# Patient Record
Sex: FEMALE | Race: WHITE | NOT HISPANIC OR LATINO | Employment: UNEMPLOYED | URBAN - METROPOLITAN AREA
[De-identification: names, ages, dates, MRNs, and addresses within clinical notes are randomized per-mention and may not be internally consistent; named-entity substitution may affect disease eponyms.]

---

## 2017-02-02 ENCOUNTER — COMMUNICATION - HEALTHEAST (OUTPATIENT)
Dept: FAMILY MEDICINE | Facility: CLINIC | Age: 52
End: 2017-02-02

## 2017-02-02 DIAGNOSIS — N95.9 MENOPAUSAL AND POSTMENOPAUSAL DISORDER: ICD-10-CM

## 2017-03-15 ENCOUNTER — RECORDS - HEALTHEAST (OUTPATIENT)
Dept: MAMMOGRAPHY | Facility: CLINIC | Age: 52
End: 2017-03-15

## 2017-03-15 DIAGNOSIS — Z12.31 ENCOUNTER FOR SCREENING MAMMOGRAM FOR MALIGNANT NEOPLASM OF BREAST: ICD-10-CM

## 2017-05-01 ENCOUNTER — COMMUNICATION - HEALTHEAST (OUTPATIENT)
Dept: FAMILY MEDICINE | Facility: CLINIC | Age: 52
End: 2017-05-01

## 2017-05-01 DIAGNOSIS — N95.9 MENOPAUSAL AND POSTMENOPAUSAL DISORDER: ICD-10-CM

## 2017-05-04 ENCOUNTER — COMMUNICATION - HEALTHEAST (OUTPATIENT)
Dept: FAMILY MEDICINE | Facility: CLINIC | Age: 52
End: 2017-05-04

## 2017-05-04 DIAGNOSIS — E03.9 UNSPECIFIED HYPOTHYROIDISM: ICD-10-CM

## 2017-05-19 ENCOUNTER — OFFICE VISIT - HEALTHEAST (OUTPATIENT)
Dept: FAMILY MEDICINE | Facility: CLINIC | Age: 52
End: 2017-05-19

## 2017-05-19 DIAGNOSIS — Z13.21 SCREENING FOR ENDOCRINE, NUTRITIONAL, METABOLIC AND IMMUNITY DISORDER: ICD-10-CM

## 2017-05-19 DIAGNOSIS — E03.9 HYPOTHYROIDISM: ICD-10-CM

## 2017-05-19 DIAGNOSIS — Z00.00 VISIT FOR PREVENTIVE HEALTH EXAMINATION: ICD-10-CM

## 2017-05-19 DIAGNOSIS — R63.6 UNDERWEIGHT: ICD-10-CM

## 2017-05-19 DIAGNOSIS — M85.80 OSTEOPENIA: ICD-10-CM

## 2017-05-19 DIAGNOSIS — Z13.29 SCREENING FOR ENDOCRINE, NUTRITIONAL, METABOLIC AND IMMUNITY DISORDER: ICD-10-CM

## 2017-05-19 DIAGNOSIS — Z13.0 SCREENING FOR ENDOCRINE, NUTRITIONAL, METABOLIC AND IMMUNITY DISORDER: ICD-10-CM

## 2017-05-19 DIAGNOSIS — Z13.228 SCREENING FOR ENDOCRINE, NUTRITIONAL, METABOLIC AND IMMUNITY DISORDER: ICD-10-CM

## 2017-05-19 LAB
CHOLEST SERPL-MCNC: 201 MG/DL
FASTING STATUS PATIENT QL REPORTED: YES
HDLC SERPL-MCNC: 84 MG/DL
LDLC SERPL CALC-MCNC: 101 MG/DL
TRIGL SERPL-MCNC: 79 MG/DL

## 2017-05-19 ASSESSMENT — MIFFLIN-ST. JEOR: SCORE: 1081.25

## 2017-05-20 ENCOUNTER — COMMUNICATION - HEALTHEAST (OUTPATIENT)
Dept: FAMILY MEDICINE | Facility: CLINIC | Age: 52
End: 2017-05-20

## 2017-06-05 ENCOUNTER — RECORDS - HEALTHEAST (OUTPATIENT)
Dept: BONE DENSITY | Facility: CLINIC | Age: 52
End: 2017-06-05

## 2017-06-05 ENCOUNTER — RECORDS - HEALTHEAST (OUTPATIENT)
Dept: ADMINISTRATIVE | Facility: OTHER | Age: 52
End: 2017-06-05

## 2017-06-05 DIAGNOSIS — E03.9 HYPOTHYROIDISM, UNSPECIFIED: ICD-10-CM

## 2017-06-05 DIAGNOSIS — M85.80 OTHER SPECIFIED DISORDERS OF BONE DENSITY AND STRUCTURE, UNSPECIFIED SITE: ICD-10-CM

## 2017-06-05 DIAGNOSIS — R63.6 UNDERWEIGHT: ICD-10-CM

## 2017-06-07 ENCOUNTER — COMMUNICATION - HEALTHEAST (OUTPATIENT)
Dept: FAMILY MEDICINE | Facility: CLINIC | Age: 52
End: 2017-06-07

## 2017-06-13 ENCOUNTER — COMMUNICATION - HEALTHEAST (OUTPATIENT)
Dept: FAMILY MEDICINE | Facility: CLINIC | Age: 52
End: 2017-06-13

## 2017-06-25 ENCOUNTER — COMMUNICATION - HEALTHEAST (OUTPATIENT)
Dept: FAMILY MEDICINE | Facility: CLINIC | Age: 52
End: 2017-06-25

## 2017-06-25 DIAGNOSIS — E03.9 UNSPECIFIED HYPOTHYROIDISM: ICD-10-CM

## 2017-11-20 ENCOUNTER — COMMUNICATION - HEALTHEAST (OUTPATIENT)
Dept: FAMILY MEDICINE | Facility: CLINIC | Age: 52
End: 2017-11-20

## 2017-11-20 DIAGNOSIS — N95.9 MENOPAUSAL AND POSTMENOPAUSAL DISORDER: ICD-10-CM

## 2018-01-30 ENCOUNTER — OFFICE VISIT - HEALTHEAST (OUTPATIENT)
Dept: FAMILY MEDICINE | Facility: CLINIC | Age: 53
End: 2018-01-30

## 2018-01-30 DIAGNOSIS — F43.9 SITUATIONAL STRESS: ICD-10-CM

## 2018-01-30 DIAGNOSIS — F41.9 ANXIETY: ICD-10-CM

## 2018-03-07 ENCOUNTER — COMMUNICATION - HEALTHEAST (OUTPATIENT)
Dept: FAMILY MEDICINE | Facility: CLINIC | Age: 53
End: 2018-03-07

## 2018-04-03 ENCOUNTER — RECORDS - HEALTHEAST (OUTPATIENT)
Dept: MAMMOGRAPHY | Facility: CLINIC | Age: 53
End: 2018-04-03

## 2018-04-03 DIAGNOSIS — Z12.31 ENCOUNTER FOR SCREENING MAMMOGRAM FOR MALIGNANT NEOPLASM OF BREAST: ICD-10-CM

## 2018-04-05 ENCOUNTER — COMMUNICATION - HEALTHEAST (OUTPATIENT)
Dept: FAMILY MEDICINE | Facility: CLINIC | Age: 53
End: 2018-04-05

## 2018-04-17 ENCOUNTER — OFFICE VISIT - HEALTHEAST (OUTPATIENT)
Dept: FAMILY MEDICINE | Facility: CLINIC | Age: 53
End: 2018-04-17

## 2018-04-17 ENCOUNTER — RECORDS - HEALTHEAST (OUTPATIENT)
Dept: GENERAL RADIOLOGY | Facility: CLINIC | Age: 53
End: 2018-04-17

## 2018-04-17 DIAGNOSIS — M75.92 SHOULDER LESION, UNSPECIFIED, LEFT SHOULDER: ICD-10-CM

## 2018-04-17 DIAGNOSIS — E03.9 HYPOTHYROIDISM: ICD-10-CM

## 2018-04-17 DIAGNOSIS — Z13.29 SCREENING FOR ENDOCRINE, NUTRITIONAL, METABOLIC AND IMMUNITY DISORDER: ICD-10-CM

## 2018-04-17 DIAGNOSIS — Z13.228 SCREENING FOR ENDOCRINE, NUTRITIONAL, METABOLIC AND IMMUNITY DISORDER: ICD-10-CM

## 2018-04-17 DIAGNOSIS — Z00.00 VISIT FOR PREVENTIVE HEALTH EXAMINATION: ICD-10-CM

## 2018-04-17 DIAGNOSIS — Z13.0 SCREENING FOR ENDOCRINE, NUTRITIONAL, METABOLIC AND IMMUNITY DISORDER: ICD-10-CM

## 2018-04-17 DIAGNOSIS — M75.92 SHOULDER LESION, LEFT: ICD-10-CM

## 2018-04-17 DIAGNOSIS — Z12.11 SCREEN FOR COLON CANCER: ICD-10-CM

## 2018-04-17 DIAGNOSIS — N95.9 MENOPAUSAL DISORDER: ICD-10-CM

## 2018-04-17 DIAGNOSIS — Z13.21 SCREENING FOR ENDOCRINE, NUTRITIONAL, METABOLIC AND IMMUNITY DISORDER: ICD-10-CM

## 2018-04-17 DIAGNOSIS — F41.9 ANXIETY: ICD-10-CM

## 2018-04-17 LAB
ALBUMIN SERPL-MCNC: 4.3 G/DL (ref 3.5–5)
ALP SERPL-CCNC: 74 U/L (ref 45–120)
ALT SERPL W P-5'-P-CCNC: 20 U/L (ref 0–45)
ANION GAP SERPL CALCULATED.3IONS-SCNC: 8 MMOL/L (ref 5–18)
AST SERPL W P-5'-P-CCNC: 42 U/L (ref 0–40)
BILIRUB SERPL-MCNC: 0.6 MG/DL (ref 0–1)
BUN SERPL-MCNC: 18 MG/DL (ref 8–22)
CALCIUM SERPL-MCNC: 9.8 MG/DL (ref 8.5–10.5)
CHLORIDE BLD-SCNC: 102 MMOL/L (ref 98–107)
CHOLEST SERPL-MCNC: 209 MG/DL
CO2 SERPL-SCNC: 30 MMOL/L (ref 22–31)
CREAT SERPL-MCNC: 1.07 MG/DL (ref 0.6–1.1)
ESTRADIOL SERPL-MCNC: 60 PG/ML
FASTING STATUS PATIENT QL REPORTED: ABNORMAL
GFR SERPL CREATININE-BSD FRML MDRD: 54 ML/MIN/1.73M2
GLUCOSE BLD-MCNC: 94 MG/DL (ref 70–125)
HDLC SERPL-MCNC: 82 MG/DL
LDLC SERPL CALC-MCNC: 109 MG/DL
POTASSIUM BLD-SCNC: 4.5 MMOL/L (ref 3.5–5)
PROT SERPL-MCNC: 7.2 G/DL (ref 6–8)
SODIUM SERPL-SCNC: 140 MMOL/L (ref 136–145)
T3 SERPL-MCNC: 87 NG/DL (ref 45–175)
T4 FREE SERPL-MCNC: 1 NG/DL (ref 0.7–1.8)
TRIGL SERPL-MCNC: 88 MG/DL
TSH SERPL DL<=0.005 MIU/L-ACNC: 4.65 UIU/ML (ref 0.3–5)

## 2018-04-17 ASSESSMENT — MIFFLIN-ST. JEOR: SCORE: 1085.78

## 2018-05-01 ENCOUNTER — COMMUNICATION - HEALTHEAST (OUTPATIENT)
Dept: FAMILY MEDICINE | Facility: CLINIC | Age: 53
End: 2018-05-01

## 2018-05-14 ENCOUNTER — RECORDS - HEALTHEAST (OUTPATIENT)
Dept: ADMINISTRATIVE | Facility: OTHER | Age: 53
End: 2018-05-14

## 2018-06-21 ENCOUNTER — RECORDS - HEALTHEAST (OUTPATIENT)
Dept: ADMINISTRATIVE | Facility: OTHER | Age: 53
End: 2018-06-21

## 2018-10-23 ENCOUNTER — COMMUNICATION - HEALTHEAST (OUTPATIENT)
Dept: FAMILY MEDICINE | Facility: CLINIC | Age: 53
End: 2018-10-23

## 2018-10-23 DIAGNOSIS — E03.9 HYPOTHYROIDISM: ICD-10-CM

## 2018-12-20 ENCOUNTER — COMMUNICATION - HEALTHEAST (OUTPATIENT)
Dept: FAMILY MEDICINE | Facility: CLINIC | Age: 53
End: 2018-12-20

## 2018-12-20 ENCOUNTER — RECORDS - HEALTHEAST (OUTPATIENT)
Dept: ADMINISTRATIVE | Facility: OTHER | Age: 53
End: 2018-12-20

## 2018-12-20 DIAGNOSIS — D22.9 ATYPICAL MOLE: ICD-10-CM

## 2019-02-14 ENCOUNTER — RECORDS - HEALTHEAST (OUTPATIENT)
Dept: ADMINISTRATIVE | Facility: OTHER | Age: 54
End: 2019-02-14

## 2019-03-04 ENCOUNTER — COMMUNICATION - HEALTHEAST (OUTPATIENT)
Dept: FAMILY MEDICINE | Facility: CLINIC | Age: 54
End: 2019-03-04

## 2019-03-04 DIAGNOSIS — N95.9 MENOPAUSAL AND POSTMENOPAUSAL DISORDER: ICD-10-CM

## 2019-09-05 ENCOUNTER — COMMUNICATION - HEALTHEAST (OUTPATIENT)
Dept: FAMILY MEDICINE | Facility: CLINIC | Age: 54
End: 2019-09-05

## 2019-09-05 DIAGNOSIS — N95.9 MENOPAUSAL AND POSTMENOPAUSAL DISORDER: ICD-10-CM

## 2019-10-26 ENCOUNTER — OFFICE VISIT (OUTPATIENT)
Dept: URGENT CARE | Facility: URGENT CARE | Age: 54
End: 2019-10-26
Payer: COMMERCIAL

## 2019-10-26 VITALS
TEMPERATURE: 97.5 F | WEIGHT: 115 LBS | SYSTOLIC BLOOD PRESSURE: 100 MMHG | DIASTOLIC BLOOD PRESSURE: 64 MMHG | OXYGEN SATURATION: 100 % | HEART RATE: 69 BPM | BODY MASS INDEX: 18.48 KG/M2 | HEIGHT: 66 IN

## 2019-10-26 DIAGNOSIS — S61.011A LACERATION OF RIGHT THUMB WITHOUT FOREIGN BODY WITHOUT DAMAGE TO NAIL, INITIAL ENCOUNTER: Primary | ICD-10-CM

## 2019-10-26 PROCEDURE — 12001 RPR S/N/AX/GEN/TRNK 2.5CM/<: CPT | Performed by: FAMILY MEDICINE

## 2019-10-26 ASSESSMENT — MIFFLIN-ST. JEOR: SCORE: 1148.39

## 2019-10-27 NOTE — PROGRESS NOTES
"SUBJECTIVE:  Suzi is a 54 year old female who presents to urgent care with a laceration on the right thumb.  Immediately prior to coming here today, she was holding a piece of glass when it shattered and cut her right thumb. Quite significant bleeding ensued,  put pressure on it and they came right here. She is having only mild pain - feeling a lot of pressure as he pushes on it (). Can feel the thumb tip she thinks.     Tetanus 4/2018  She is healthy except hypothyroidism. Takes no blood thinners. Does not smoke.     OBJECTIVE:  /64 (BP Location: Left arm, Patient Position: Supine, Cuff Size: Adult Regular)   Pulse 69   Temp 97.5  F (36.4  C) (Oral)   Ht 1.676 m (5' 6\")   Wt 52.2 kg (115 lb)   SpO2 100%   BMI 18.56 kg/m     GENERAL APPEARANCE: healthy, alert and no distress  Hand: Right thumb with a laceration to the medial aspect of the thumb from just milimeters from the nail (but not including it) near the tip down to just short of the MCP joint. It is 2.5cm in length, gaping, but can be approximated, bleeding profusely, but stops with manual wound edge approximation and pressure. Once a digital nerve block was completed, the wound was explored for foreign material and none was found.   Additionally, there is a superficial scrape with skin flap on the dorsal aspect of the thumb at the base. That is only through the dermis, no significant bleeding. There is additionally a 1cm superficial scrape on the dorsal aspect of the hand just medial to the thumb that is shallow and not requiring repair.     DIAGNOSTICS  None         ASSESSMENT/PLAN:  Suzi was seen today for urgent care and laceration.    Diagnoses and all orders for this visit:    Laceration of right thumb without foreign body without damage to nail, initial encounter  -     CLOSURE SUPERFICIAL, WOUND DEHIS SIMPLE        ASSESSMENT:   Laceration as described.    PLAN:     Anesthesia with 2% Lidocaine without Epinephrine was used to " complete a digital nerve block of the right thumb using sterile technique. Wound cleansed, debrided of visible foreign material and necrotic tissue, and sutured. Seven 5-0 Ethilon sutures were used. Antibiotic ointment and dressing applied.  Wound care instructions provided. Tolerated the procedure well. Observe for any signs of infection or other problems.  Return for suture removal in 10 days (due to the high mobility of the area - discussed scarring with the sutures in longer).     The plan of care was discussed with the Patient and her family. They understand and agree with the course of treatment prescribed. A printed summary was given including instructions and medications.      Lianne Mejia MD  Family Medicine

## 2019-10-27 NOTE — PATIENT INSTRUCTIONS
Patient Education     Suture Care    Stitches (sutures) are used to close wounds. Sutures also help stop bleeding and speed healing. To help your wound heal, follow the tips on this handout.  Some sutures need to be removed by a healthcare provider. Others dissolve on their own. Sometimes strips of tape are used. You ll be told what kind of sutures you have.   Keep sutures clean    Avoid doing things that could cause dirt or sweat to get on your sutures. If needed, cover your sutures with a bandage (dressing) to protect them.    Don t pick at scabs. They help protect the wound.    Don t wash the area around your sutures unless your healthcare provider says it s OK. Then, follow his or her instructions for washing and drying.  Keep sutures dry    Keep your sutures out of water.    Take a sponge bath to avoid getting your sutures wound wet, unless your healthcare provider tells you otherwise.    Ask your provider when can you take a shower or bathe.    Ask your provider about the best way to keep your sutures dry when bathing or showering.    If sutures get damp, pat them dry.  Changing your dressing  Leave the dressing in place until you are told to remove it or change it. Change it only as directed, using clean hands:    After the first 24 hours, change your dressing every 24 hours.    Change your dressing if it gets wet or dirty.  Other tips    To help wounds on an arm or leg heal, use the affected limb as little as possible.    To help reduce swelling and throbbing, raise the area with sutures above your heart.    To help prevent itching, cover sutures with gauze. If sutures itch, try not to scratch them.    For pain relief, try acetaminophen or ibuprofen. Don t use aspirin. It can increase bleeding.  When to seek medical care  Call your healthcare provider if you notice any of the following signs:    Increased soreness, pain, or tenderness after 24 hours    A red streak, increased redness, or puffiness near the  wound    White, yellowish, or bad smelling discharge from the wound    Bleeding that can t be stopped by applying pressure    Steri-Strips fall off or stitches dissolve before the wound heals    Fever over 100.4 F (38.0 C)   Date Last Reviewed: 7/1/2016 2000-2018 [employer]. [address] [place], [place] [id number]. All rights reserved. This information is not intended as a substitute for professional medical care. Always follow your healthcare professional's instructions.

## 2019-10-30 ENCOUNTER — TELEPHONE (OUTPATIENT)
Dept: URGENT CARE | Facility: URGENT CARE | Age: 54
End: 2019-10-30

## 2019-10-30 NOTE — TELEPHONE ENCOUNTER
Pt stop in the [facility] to see if a nurse can call her back asap about how to change her dressing

## 2019-10-30 NOTE — TELEPHONE ENCOUNTER
Reason for Call:  Other call back    Detailed comments: Pt was seen in UC on 10/26. She has questions about her bandages and how she be switching them out. Please advise.     Phone Number Patient can be reached at: Cell number on file:    Telephone Information:   Mobile [phone]       Best Time: anytime    Can we leave a detailed message on this number? YES    Call taken on 10/30/2019 at 10:03 AM by Cristiana Watts

## 2019-10-30 NOTE — TELEPHONE ENCOUNTER
Please call & ask patient her ?s    I reviewed note from Dr. Lang and she said to return for suture removal in 10 days.  Note stated she also gave a printed summary with instructions..

## 2019-10-31 ENCOUNTER — ALLIED HEALTH/NURSE VISIT (OUTPATIENT)
Dept: NURSING | Facility: CLINIC | Age: 54
End: 2019-10-31
Payer: COMMERCIAL

## 2019-10-31 ENCOUNTER — TELEPHONE (OUTPATIENT)
Dept: FAMILY MEDICINE | Facility: CLINIC | Age: 54
End: 2019-10-31

## 2019-10-31 DIAGNOSIS — Z48.00 CHANGE OF DRESSING: Primary | ICD-10-CM

## 2019-10-31 PROCEDURE — 99207 ZZC NO CHARGE NURSE ONLY: CPT

## 2019-10-31 NOTE — TELEPHONE ENCOUNTER
Urgent care on 10/26 for stitches. Has appt on 11/6 to remove . Stitches inside of right thumb. TOld to keep guaze on for 24 hours  Told to put ointment on it and has sticky   She can't remove the bandage.     Difficult to understand what she is talking about. She will come to clinic at 1pm and RN can check    Citlali Cunningham RN, BSN

## 2019-10-31 NOTE — NURSING NOTE
Pt had sutures on 10/26 on thumb and has not been able to bring herself to remove the dressing as it is sticking due to the paper tape. Dr Mejia was here and looked at it again and told pt the dressing must come off. Dressing was removed by writer with no problem, some sticking of tape. Cleansed some of the dried blood off with h2o2 but did not get the area around the sutures wet. Reapplied non stick dressing with a small amount of bacitracin ointment.     Pt will return for suture removal on 11/6.      Citlali Cunningham, RN, BSN

## 2019-11-06 ENCOUNTER — APPOINTMENT (OUTPATIENT)
Dept: NURSING | Facility: CLINIC | Age: 54
End: 2019-11-06
Payer: COMMERCIAL

## 2019-11-25 ENCOUNTER — OFFICE VISIT - HEALTHEAST (OUTPATIENT)
Dept: FAMILY MEDICINE | Facility: CLINIC | Age: 54
End: 2019-11-25

## 2019-11-25 DIAGNOSIS — N95.9 MENOPAUSAL DISORDER: ICD-10-CM

## 2019-11-25 DIAGNOSIS — Z13.21 SCREENING FOR ENDOCRINE, NUTRITIONAL, METABOLIC AND IMMUNITY DISORDER: ICD-10-CM

## 2019-11-25 DIAGNOSIS — Z12.31 VISIT FOR SCREENING MAMMOGRAM: ICD-10-CM

## 2019-11-25 DIAGNOSIS — E03.9 HYPOTHYROIDISM, UNSPECIFIED TYPE: ICD-10-CM

## 2019-11-25 DIAGNOSIS — Z13.0 SCREENING FOR ENDOCRINE, NUTRITIONAL, METABOLIC AND IMMUNITY DISORDER: ICD-10-CM

## 2019-11-25 DIAGNOSIS — Z13.29 SCREENING FOR ENDOCRINE, NUTRITIONAL, METABOLIC AND IMMUNITY DISORDER: ICD-10-CM

## 2019-11-25 DIAGNOSIS — Z13.228 SCREENING FOR ENDOCRINE, NUTRITIONAL, METABOLIC AND IMMUNITY DISORDER: ICD-10-CM

## 2019-11-25 DIAGNOSIS — Z00.00 VISIT FOR PREVENTIVE HEALTH EXAMINATION: ICD-10-CM

## 2019-11-25 DIAGNOSIS — Z11.4 SCREENING FOR HIV WITHOUT PRESENCE OF RISK FACTORS: ICD-10-CM

## 2019-11-25 LAB
ALBUMIN SERPL-MCNC: 4.2 G/DL (ref 3.5–5)
ALP SERPL-CCNC: 59 U/L (ref 45–120)
ALT SERPL W P-5'-P-CCNC: 17 U/L (ref 0–45)
ANION GAP SERPL CALCULATED.3IONS-SCNC: 10 MMOL/L (ref 5–18)
AST SERPL W P-5'-P-CCNC: 31 U/L (ref 0–40)
BASOPHILS # BLD AUTO: 0 THOU/UL (ref 0–0.2)
BASOPHILS NFR BLD AUTO: 0 % (ref 0–2)
BILIRUB SERPL-MCNC: 0.3 MG/DL (ref 0–1)
BUN SERPL-MCNC: 22 MG/DL (ref 8–22)
CALCIUM SERPL-MCNC: 9.4 MG/DL (ref 8.5–10.5)
CHLORIDE BLD-SCNC: 104 MMOL/L (ref 98–107)
CHOLEST SERPL-MCNC: 187 MG/DL
CO2 SERPL-SCNC: 26 MMOL/L (ref 22–31)
CREAT SERPL-MCNC: 1.09 MG/DL (ref 0.6–1.1)
EOSINOPHIL # BLD AUTO: 0.1 THOU/UL (ref 0–0.4)
EOSINOPHIL NFR BLD AUTO: 1 % (ref 0–6)
ERYTHROCYTE [DISTWIDTH] IN BLOOD BY AUTOMATED COUNT: 11 % (ref 11–14.5)
ESTRADIOL SERPL-MCNC: 77 PG/ML
FASTING STATUS PATIENT QL REPORTED: NO
GFR SERPL CREATININE-BSD FRML MDRD: 53 ML/MIN/1.73M2
GLUCOSE BLD-MCNC: 106 MG/DL (ref 70–125)
HCT VFR BLD AUTO: 40 % (ref 35–47)
HDLC SERPL-MCNC: 86 MG/DL
HGB BLD-MCNC: 13.5 G/DL (ref 12–16)
HIV 1+2 AB+HIV1 P24 AG SERPL QL IA: NEGATIVE
IRON SATN MFR SERPL: 42 % (ref 20–50)
IRON SERPL-MCNC: 115 UG/DL (ref 42–175)
LDLC SERPL CALC-MCNC: 87 MG/DL
LYMPHOCYTES # BLD AUTO: 1.9 THOU/UL (ref 0.8–4.4)
LYMPHOCYTES NFR BLD AUTO: 40 % (ref 20–40)
MCH RBC QN AUTO: 33.7 PG (ref 27–34)
MCHC RBC AUTO-ENTMCNC: 33.8 G/DL (ref 32–36)
MCV RBC AUTO: 100 FL (ref 80–100)
MONOCYTES # BLD AUTO: 0.4 THOU/UL (ref 0–0.9)
MONOCYTES NFR BLD AUTO: 8 % (ref 2–10)
NEUTROPHILS # BLD AUTO: 2.5 THOU/UL (ref 2–7.7)
NEUTROPHILS NFR BLD AUTO: 51 % (ref 50–70)
PLATELET # BLD AUTO: 166 THOU/UL (ref 140–440)
PMV BLD AUTO: 6.9 FL (ref 7–10)
POTASSIUM BLD-SCNC: 4.2 MMOL/L (ref 3.5–5)
PROT SERPL-MCNC: 6.9 G/DL (ref 6–8)
RBC # BLD AUTO: 4.01 MILL/UL (ref 3.8–5.4)
SODIUM SERPL-SCNC: 140 MMOL/L (ref 136–145)
T4 FREE SERPL-MCNC: 1.2 NG/DL (ref 0.7–1.8)
TIBC SERPL-MCNC: 277 UG/DL (ref 313–563)
TRANSFERRIN SERPL-MCNC: 222 MG/DL (ref 212–360)
TRIGL SERPL-MCNC: 71 MG/DL
TSH SERPL DL<=0.005 MIU/L-ACNC: 1.44 UIU/ML (ref 0.3–5)
WBC: 4.8 THOU/UL (ref 4–11)

## 2019-11-25 ASSESSMENT — ANXIETY QUESTIONNAIRES
3. WORRYING TOO MUCH ABOUT DIFFERENT THINGS: SEVERAL DAYS
GAD7 TOTAL SCORE: 5
2. NOT BEING ABLE TO STOP OR CONTROL WORRYING: SEVERAL DAYS
IF YOU CHECKED OFF ANY PROBLEMS ON THIS QUESTIONNAIRE, HOW DIFFICULT HAVE THESE PROBLEMS MADE IT FOR YOU TO DO YOUR WORK, TAKE CARE OF THINGS AT HOME, OR GET ALONG WITH OTHER PEOPLE: SOMEWHAT DIFFICULT
5. BEING SO RESTLESS THAT IT IS HARD TO SIT STILL: NOT AT ALL
4. TROUBLE RELAXING: NOT AT ALL
6. BECOMING EASILY ANNOYED OR IRRITABLE: SEVERAL DAYS
1. FEELING NERVOUS, ANXIOUS, OR ON EDGE: SEVERAL DAYS
7. FEELING AFRAID AS IF SOMETHING AWFUL MIGHT HAPPEN: SEVERAL DAYS

## 2019-11-25 ASSESSMENT — PATIENT HEALTH QUESTIONNAIRE - PHQ9: SUM OF ALL RESPONSES TO PHQ QUESTIONS 1-9: 1

## 2019-11-25 ASSESSMENT — MIFFLIN-ST. JEOR: SCORE: 1091.22

## 2020-02-16 ENCOUNTER — COMMUNICATION - HEALTHEAST (OUTPATIENT)
Dept: FAMILY MEDICINE | Facility: CLINIC | Age: 55
End: 2020-02-16

## 2020-02-16 DIAGNOSIS — E03.9 HYPOTHYROIDISM: ICD-10-CM

## 2020-02-16 DIAGNOSIS — N95.9 MENOPAUSAL AND POSTMENOPAUSAL DISORDER: ICD-10-CM

## 2020-03-05 ENCOUNTER — RECORDS - HEALTHEAST (OUTPATIENT)
Dept: MAMMOGRAPHY | Facility: CLINIC | Age: 55
End: 2020-03-05

## 2020-03-05 DIAGNOSIS — Z12.31 ENCOUNTER FOR SCREENING MAMMOGRAM FOR MALIGNANT NEOPLASM OF BREAST: ICD-10-CM

## 2020-03-10 ENCOUNTER — RECORDS - HEALTHEAST (OUTPATIENT)
Dept: RADIOLOGY | Facility: CLINIC | Age: 55
End: 2020-03-10

## 2020-03-10 ENCOUNTER — HOSPITAL ENCOUNTER (OUTPATIENT)
Dept: MAMMOGRAPHY | Facility: CLINIC | Age: 55
Discharge: HOME | End: 2020-03-10
Attending: FAMILY MEDICINE

## 2020-03-10 DIAGNOSIS — R92.0 BREAST MICROCALCIFICATIONS: ICD-10-CM

## 2020-05-15 ENCOUNTER — COMMUNICATION - HEALTHEAST (OUTPATIENT)
Dept: FAMILY MEDICINE | Facility: CLINIC | Age: 55
End: 2020-05-15

## 2020-05-15 DIAGNOSIS — N95.9 MENOPAUSAL AND POSTMENOPAUSAL DISORDER: ICD-10-CM

## 2020-07-03 ENCOUNTER — COMMUNICATION - HEALTHEAST (OUTPATIENT)
Dept: FAMILY MEDICINE | Facility: CLINIC | Age: 55
End: 2020-07-03

## 2021-01-20 ENCOUNTER — OFFICE VISIT - HEALTHEAST (OUTPATIENT)
Dept: FAMILY MEDICINE | Facility: CLINIC | Age: 56
End: 2021-01-20

## 2021-01-20 DIAGNOSIS — Z13.0 SCREENING FOR ENDOCRINE, NUTRITIONAL, METABOLIC AND IMMUNITY DISORDER: ICD-10-CM

## 2021-01-20 DIAGNOSIS — E03.9 HYPOTHYROIDISM, UNSPECIFIED TYPE: ICD-10-CM

## 2021-01-20 DIAGNOSIS — R03.0 ELEVATED BLOOD-PRESSURE READING, WITHOUT DIAGNOSIS OF HYPERTENSION: ICD-10-CM

## 2021-01-20 DIAGNOSIS — N95.9 MENOPAUSAL DISORDER: ICD-10-CM

## 2021-01-20 DIAGNOSIS — Z85.43 HISTORY OF OVARIAN CANCER: ICD-10-CM

## 2021-01-20 DIAGNOSIS — R63.6 UNDERWEIGHT: ICD-10-CM

## 2021-01-20 DIAGNOSIS — Z11.59 ENCOUNTER FOR HCV SCREENING TEST FOR LOW RISK PATIENT: ICD-10-CM

## 2021-01-20 DIAGNOSIS — Z13.29 SCREENING FOR ENDOCRINE, NUTRITIONAL, METABOLIC AND IMMUNITY DISORDER: ICD-10-CM

## 2021-01-20 DIAGNOSIS — Z13.21 SCREENING FOR ENDOCRINE, NUTRITIONAL, METABOLIC AND IMMUNITY DISORDER: ICD-10-CM

## 2021-01-20 DIAGNOSIS — Z13.228 SCREENING FOR ENDOCRINE, NUTRITIONAL, METABOLIC AND IMMUNITY DISORDER: ICD-10-CM

## 2021-01-20 DIAGNOSIS — Z00.00 VISIT FOR PREVENTIVE HEALTH EXAMINATION: ICD-10-CM

## 2021-01-20 LAB
ALBUMIN SERPL-MCNC: 4.5 G/DL (ref 3.5–5)
ALP SERPL-CCNC: 59 U/L (ref 45–120)
ALT SERPL W P-5'-P-CCNC: 17 U/L (ref 0–45)
ANION GAP SERPL CALCULATED.3IONS-SCNC: 11 MMOL/L (ref 5–18)
AST SERPL W P-5'-P-CCNC: 30 U/L (ref 0–40)
BILIRUB SERPL-MCNC: 0.6 MG/DL (ref 0–1)
BUN SERPL-MCNC: 19 MG/DL (ref 8–22)
CALCIUM SERPL-MCNC: 9.6 MG/DL (ref 8.5–10.5)
CHLORIDE BLD-SCNC: 103 MMOL/L (ref 98–107)
CHOLEST SERPL-MCNC: 214 MG/DL
CO2 SERPL-SCNC: 25 MMOL/L (ref 22–31)
CREAT SERPL-MCNC: 1.13 MG/DL (ref 0.6–1.1)
CREAT UR-MCNC: 116.5 MG/DL
ERYTHROCYTE [DISTWIDTH] IN BLOOD BY AUTOMATED COUNT: 10.8 % (ref 11–14.5)
ESTRADIOL SERPL-MCNC: 97 PG/ML
FASTING STATUS PATIENT QL REPORTED: NO
GFR SERPL CREATININE-BSD FRML MDRD: 50 ML/MIN/1.73M2
GLUCOSE BLD-MCNC: 95 MG/DL (ref 70–125)
HCT VFR BLD AUTO: 39.2 % (ref 35–47)
HDLC SERPL-MCNC: 87 MG/DL
HGB BLD-MCNC: 13.7 G/DL (ref 12–16)
LDLC SERPL CALC-MCNC: 108 MG/DL
MCH RBC QN AUTO: 33.9 PG (ref 27–34)
MCHC RBC AUTO-ENTMCNC: 34.8 G/DL (ref 32–36)
MCV RBC AUTO: 97 FL (ref 80–100)
MICROALBUMIN UR-MCNC: 1.93 MG/DL (ref 0–1.99)
MICROALBUMIN/CREAT UR: 16.6 MG/G
PLATELET # BLD AUTO: 174 THOU/UL (ref 140–440)
PMV BLD AUTO: 7.4 FL (ref 7–10)
POTASSIUM BLD-SCNC: 4.2 MMOL/L (ref 3.5–5)
PROT SERPL-MCNC: 7.2 G/DL (ref 6–8)
RBC # BLD AUTO: 4.03 MILL/UL (ref 3.8–5.4)
SODIUM SERPL-SCNC: 139 MMOL/L (ref 136–145)
T4 FREE SERPL-MCNC: 1 NG/DL (ref 0.7–1.8)
TRIGL SERPL-MCNC: 96 MG/DL
TSH SERPL DL<=0.005 MIU/L-ACNC: 4.08 UIU/ML (ref 0.3–5)
WBC: 5.1 THOU/UL (ref 4–11)

## 2021-01-20 ASSESSMENT — MIFFLIN-ST. JEOR: SCORE: 1098.56

## 2021-01-20 ASSESSMENT — PATIENT HEALTH QUESTIONNAIRE - PHQ9: SUM OF ALL RESPONSES TO PHQ QUESTIONS 1-9: 3

## 2021-01-21 LAB — HCV AB SERPL QL IA: NEGATIVE

## 2021-01-22 ENCOUNTER — COMMUNICATION - HEALTHEAST (OUTPATIENT)
Dept: FAMILY MEDICINE | Facility: CLINIC | Age: 56
End: 2021-01-22

## 2021-01-22 DIAGNOSIS — N95.9 MENOPAUSAL AND POSTMENOPAUSAL DISORDER: ICD-10-CM

## 2021-01-24 ENCOUNTER — APPOINTMENT (OUTPATIENT)
Dept: GENERAL RADIOLOGY | Facility: CLINIC | Age: 56
End: 2021-01-24
Attending: EMERGENCY MEDICINE
Payer: COMMERCIAL

## 2021-01-24 ENCOUNTER — HOSPITAL ENCOUNTER (EMERGENCY)
Facility: CLINIC | Age: 56
Discharge: HOME | End: 2021-01-24
Attending: EMERGENCY MEDICINE | Admitting: EMERGENCY MEDICINE
Payer: COMMERCIAL

## 2021-01-24 ENCOUNTER — RECORDS - HEALTHEAST (OUTPATIENT)
Dept: ADMINISTRATIVE | Facility: OTHER | Age: 56
End: 2021-01-24

## 2021-01-24 ENCOUNTER — OFFICE VISIT (OUTPATIENT)
Dept: URGENT CARE | Facility: URGENT CARE | Age: 56
End: 2021-01-24
Payer: COMMERCIAL

## 2021-01-24 ENCOUNTER — ANCILLARY PROCEDURE (OUTPATIENT)
Dept: ULTRASOUND IMAGING | Facility: CLINIC | Age: 56
End: 2021-01-24
Attending: EMERGENCY MEDICINE
Payer: COMMERCIAL

## 2021-01-24 VITALS
HEIGHT: 66 IN | WEIGHT: 106 LBS | OXYGEN SATURATION: 100 % | BODY MASS INDEX: 17.04 KG/M2 | HEART RATE: 99 BPM | SYSTOLIC BLOOD PRESSURE: 164 MMHG | DIASTOLIC BLOOD PRESSURE: 98 MMHG | TEMPERATURE: 97.4 F

## 2021-01-24 VITALS
RESPIRATION RATE: 20 BRPM | OXYGEN SATURATION: 100 % | DIASTOLIC BLOOD PRESSURE: 86 MMHG | TEMPERATURE: 96.7 F | HEART RATE: 77 BPM | SYSTOLIC BLOOD PRESSURE: 135 MMHG

## 2021-01-24 DIAGNOSIS — R20.0 NUMBNESS AND TINGLING OF RIGHT ARM: Primary | ICD-10-CM

## 2021-01-24 DIAGNOSIS — R03.0 ELEVATED BLOOD PRESSURE READING WITHOUT DIAGNOSIS OF HYPERTENSION: ICD-10-CM

## 2021-01-24 DIAGNOSIS — R07.89 ATYPICAL CHEST PAIN: ICD-10-CM

## 2021-01-24 DIAGNOSIS — R07.9 CHEST PAIN, UNSPECIFIED TYPE: ICD-10-CM

## 2021-01-24 DIAGNOSIS — R51.9 NONINTRACTABLE HEADACHE, UNSPECIFIED CHRONICITY PATTERN, UNSPECIFIED HEADACHE TYPE: ICD-10-CM

## 2021-01-24 DIAGNOSIS — R20.2 NUMBNESS AND TINGLING OF RIGHT ARM: Primary | ICD-10-CM

## 2021-01-24 LAB
ANION GAP SERPL CALCULATED.3IONS-SCNC: 8 MMOL/L (ref 3–14)
BASOPHILS # BLD AUTO: 0 10E9/L (ref 0–0.2)
BASOPHILS NFR BLD AUTO: 0.2 %
BUN SERPL-MCNC: 19 MG/DL (ref 7–30)
CALCIUM SERPL-MCNC: 9.6 MG/DL (ref 8.5–10.1)
CHLORIDE SERPL-SCNC: 104 MMOL/L (ref 94–109)
CO2 SERPL-SCNC: 27 MMOL/L (ref 20–32)
CREAT SERPL-MCNC: 1.09 MG/DL (ref 0.52–1.04)
DIFFERENTIAL METHOD BLD: NORMAL
EOSINOPHIL # BLD AUTO: 0 10E9/L (ref 0–0.7)
EOSINOPHIL NFR BLD AUTO: 0.4 %
ERYTHROCYTE [DISTWIDTH] IN BLOOD BY AUTOMATED COUNT: 11.6 % (ref 10–15)
GFR SERPL CREATININE-BSD FRML MDRD: 58 ML/MIN/{1.73_M2}
GLUCOSE SERPL-MCNC: 94 MG/DL (ref 70–99)
HCT VFR BLD AUTO: 40.7 % (ref 35–47)
HGB BLD-MCNC: 14.1 G/DL (ref 11.7–15.7)
IMM GRANULOCYTES # BLD: 0 10E9/L (ref 0–0.4)
IMM GRANULOCYTES NFR BLD: 0.2 %
INR PPP: 0.97 (ref 0.86–1.14)
LYMPHOCYTES # BLD AUTO: 1.8 10E9/L (ref 0.8–5.3)
LYMPHOCYTES NFR BLD AUTO: 35.2 %
MCH RBC QN AUTO: 33 PG (ref 26.5–33)
MCHC RBC AUTO-ENTMCNC: 34.6 G/DL (ref 31.5–36.5)
MCV RBC AUTO: 95 FL (ref 78–100)
MONOCYTES # BLD AUTO: 0.5 10E9/L (ref 0–1.3)
MONOCYTES NFR BLD AUTO: 10 %
NEUTROPHILS # BLD AUTO: 2.7 10E9/L (ref 1.6–8.3)
NEUTROPHILS NFR BLD AUTO: 54 %
NRBC # BLD AUTO: 0 10*3/UL
NRBC BLD AUTO-RTO: 0 /100
PLATELET # BLD AUTO: 180 10E9/L (ref 150–450)
POTASSIUM SERPL-SCNC: 4.1 MMOL/L (ref 3.4–5.3)
RBC # BLD AUTO: 4.27 10E12/L (ref 3.8–5.2)
SODIUM SERPL-SCNC: 139 MMOL/L (ref 133–144)
TROPONIN I SERPL-MCNC: <0.015 UG/L (ref 0–0.04)
WBC # BLD AUTO: 5 10E9/L (ref 4–11)

## 2021-01-24 PROCEDURE — 71045 X-RAY EXAM CHEST 1 VIEW: CPT

## 2021-01-24 PROCEDURE — 85610 PROTHROMBIN TIME: CPT | Performed by: EMERGENCY MEDICINE

## 2021-01-24 PROCEDURE — 99214 OFFICE O/P EST MOD 30 MIN: CPT | Performed by: NURSE PRACTITIONER

## 2021-01-24 PROCEDURE — 99285 EMERGENCY DEPT VISIT HI MDM: CPT | Mod: 25 | Performed by: EMERGENCY MEDICINE

## 2021-01-24 PROCEDURE — 93010 ELECTROCARDIOGRAM REPORT: CPT | Performed by: EMERGENCY MEDICINE

## 2021-01-24 PROCEDURE — 93005 ELECTROCARDIOGRAM TRACING: CPT | Performed by: EMERGENCY MEDICINE

## 2021-01-24 PROCEDURE — 80048 BASIC METABOLIC PNL TOTAL CA: CPT | Performed by: EMERGENCY MEDICINE

## 2021-01-24 PROCEDURE — 84484 ASSAY OF TROPONIN QUANT: CPT | Performed by: EMERGENCY MEDICINE

## 2021-01-24 PROCEDURE — 85025 COMPLETE CBC W/AUTO DIFF WBC: CPT | Performed by: EMERGENCY MEDICINE

## 2021-01-24 PROCEDURE — 93000 ELECTROCARDIOGRAM COMPLETE: CPT | Performed by: NURSE PRACTITIONER

## 2021-01-24 ASSESSMENT — ENCOUNTER SYMPTOMS
MYALGIAS: 0
SHORTNESS OF BREATH: 0
ABDOMINAL PAIN: 0
HEADACHES: 1
COUGH: 0
FEVER: 0
UNEXPECTED WEIGHT CHANGE: 0
FATIGUE: 1
NAUSEA: 0
DIARRHEA: 0
NUMBNESS: 1
BRUISES/BLEEDS EASILY: 0
VOMITING: 0

## 2021-01-24 ASSESSMENT — MIFFLIN-ST. JEOR: SCORE: 1094.62

## 2021-01-24 NOTE — PATIENT INSTRUCTIONS
Go to emergency room now for further evaluation of tingling in right arm, fatigue, elevated blood pressure, headache. Had chest pain last night which resolved.

## 2021-01-24 NOTE — ED PROVIDER NOTES
ED Provider Note  [facility]      History     Chief Complaint   Patient presents with     Hypertension     The history is provided by the patient and medical records.     Suzi Cruz is a 55 year old female with no pertinent past medical history who presents to the ED for evaluation of hypertension.  On Wednesday, the patient had a office visit with her primary care physician for routine physical exam.  She was noted to have mildly elevated blood pressure and elevated cholesterol levels.  This morning around 1:30 AM, the patient reports an onset of chest pain that she noticed after she woke up from a loud noise that she heard in her house.  The patient asked her  to take her to the ED but the  said that she should go back to sleep.  She denies having any bitter taste in her mouth but she did note that her mouth was dry which is unusual for her.  She states that she has intermittent headaches as well.  She has also noticed that her fingertips feel numb and she has been more tired today.    Of note, the patient is under a lot of stress as she has lost her job and all of her kids are at home.  She has also been eating a poor diet and not been working out as much.  She denies any shortness of breath, fevers, cough, abdominal pain, vomiting, diarrhea, unusual weight change, bleeding/bruising easily, blurry vision, double vision or myalgia.  The patient did not note to have any family history of heart medications at a young age.  Of note, the patient has shortness of breath last March that she thought was COVID-19 but was never tested and went away on its own.    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 CNS, thyroid, ovary     Disorder of bone and cartilage, unspecified      Unspecified hypothyroidism      Past Surgical History:   Procedure Laterality Date     C  DEXA, BONE DENSITY, AXIAL SKEL  12/05     HYSTERECTOMY TOTAL ABDOMINAL, BILATERAL SALPINGO-OOPHORECTOMY, COMBINED  1989     SURGICAL HISTORY OF -   1990    BMT, donated by brother     SURGICAL HISTORY OF -       cranial port-a cath          estradiol (ESTRACE) 2 MG tablet       levothyroxine (SYNTHROID) 112 MCG tablet       MULTI-VITAMIN OR TABS       acetaminophen-codeine 300-30 MG per tablet       CALCIUM 600 + D 600-200 MG-UNIT OR TABS       ORDER FOR DME      Allergies   Allergen Reactions     No Known Drug Allergies      Family History  Family History   Problem Relation Age of Onset     Family History Negative No family hx of      Social History   Social History     Tobacco Use     Smoking status: Never Smoker     Smokeless tobacco: Never Used   Substance Use Topics     Alcohol use: Yes     Comment: 3 drinks a week     Drug use: No      Past medical history, past surgical history, medications, allergies, family history, and social history were reviewed with the patient. No additional pertinent items.       Review of Systems   Constitutional: Positive for fatigue. Negative for fever and unexpected weight change.   HENT:        Negative for bitter taste in her mouth.  Positive for dry mouth.   Eyes: Negative for visual disturbance.   Respiratory: Negative for cough and shortness of breath.    Cardiovascular: Positive for chest pain.   Gastrointestinal: Negative for abdominal pain, diarrhea, nausea and vomiting.   Musculoskeletal: Negative for myalgias.   Neurological: Positive for numbness and headaches (Intermittent, at baseline).   Hematological: Does not bruise/bleed easily.   All other systems reviewed and are negative.    A complete review of systems was performed with pertinent positives and negatives noted in the HPI, and all other systems negative.    Physical Exam   BP: (!) 156/94  Pulse: 83  Temp: 96.7  F (35.9  C)  Resp: 12  SpO2: 100 %  Physical Exam  Vitals signs and nursing note reviewed.    Constitutional:       General: She is not in acute distress.     Appearance: She is not ill-appearing, toxic-appearing or diaphoretic.      Comments: Thin   HENT:      Head: Normocephalic and atraumatic.      Right Ear: External ear normal.      Left Ear: External ear normal.      Nose: No rhinorrhea.      Mouth/Throat:      Pharynx: Oropharynx is clear.   Eyes:      General: No scleral icterus.     Extraocular Movements: Extraocular movements intact.      Conjunctiva/sclera: Conjunctivae normal.      Pupils: Pupils are equal, round, and reactive to light.   Neck:      Musculoskeletal: Normal range of motion and neck supple.   Cardiovascular:      Rate and Rhythm: Normal rate and regular rhythm.      Pulses: Normal pulses.      Heart sounds: No murmur.   Pulmonary:      Effort: Pulmonary effort is normal. No respiratory distress.      Breath sounds: Normal breath sounds. No stridor.   Abdominal:      General: Abdomen is flat. There is no distension.      Tenderness: There is no abdominal tenderness. There is no guarding.   Musculoskeletal: Normal range of motion.         General: No swelling or tenderness.      Right lower leg: No edema.      Left lower leg: No edema.   Skin:     General: Skin is warm and dry.      Capillary Refill: Capillary refill takes less than 2 seconds.      Coloration: Skin is not jaundiced.      Findings: No rash.   Neurological:      General: No focal deficit present.      Mental Status: She is alert and oriented to person, place, and time.      Motor: No weakness.      Coordination: Coordination normal.      Gait: Gait normal.   Psychiatric:         Mood and Affect: Mood is anxious.         Speech: Speech normal.         Behavior: Behavior is cooperative.         Cognition and Memory: Cognition is not impaired. Memory is not impaired.        4:00 PM  The patient was seen and examined by Cj Adame MD in Room ED08.        EKG Interpretation:      Interpreted by Cj Adame,  MD  Time reviewed: 1623    Symptoms at time of EKG: Prior Chest Pain   Rhythm: normal sinus   Rate: Normal  Axis: Normal  Ectopy: none  Conduction: normal  ST Segments/ T Waves: Non-specific ST-T wave changes  Q Waves: none  Comparison to prior: Unchanged from earlier today at urgent care    Clinical Impression: non-specific EKG without evidence for acute ischemia       Assessments & Plan (with Medical Decision Making)   This is a 55-year-old woman presenting for evaluation of transient upper chest pain at approximately 0230 this morning, resolving without intervention.  She additionally has had some paresthesias of her fingertips, intermittent headaches, and increased social stressors due to Covid pandemic loss of employment.  Twelve-lead ECG was compared to that obtained earlier through urgent care and is without acute ischemic changes or dynamic interval change.  Screening bedside cardiac ultrasound does not demonstrate global LV hypokinesis, RV enlargement, or evidence for pericardial effusion.  Differential diagnosis includes but is not limited to myofascial strain/costochondritis due to heavy yoga exercise yesterday, gastritis/esophagitis, stress/anxiety, much less likely acute coronary syndrome, pneumonia, pleurisy, spontaneous pneumothorax.  History and physical exam are inconsistent with acute pulmonary embolism or acute thoracic aortic dissection.  There is no RV enlargement or leg swelling/tenderness, there is no aortic root dilation on bedside cardiac ultrasound or murmur/color Doppler to suggest aortic insufficiency.  Diagnostic evaluation will include twelve-lead ECG, plain chest radiography, screening CBC/CMP/troponin.  Disposition pending results of diagnostic evaluation and response to therapeutic interventions.        ED Course     ED Course as of Jan 24 1833   Sun Jan 24, 2021   1649 No acute abnormality     CBC with platelets differential   1706 IMPRESSION: Hyperaeration. No other evidence of  acute cardiopulmonary  disease is seen.   XR Chest Port 1 View   1734 BP(!): 156/94   1745 IMPRESSION: Grossly normal left ventricular function and chamber size.  No pericardial effusion..   POC US ECHO LIMITED   1829 Sodium: 139   1829 Potassium: 4.1   1829 BP(!): 140/84   1829 Pulse: 65   1829 Resp: 13   1829 SpO2: 100 %   1830 Troponin I ES: <0.015   1830 Reassuring.  No acute electrolyte abnormality.     Basic metabolic panel(!)     Reevaluation:  No interval deterioration.  No distress or hemodynamic instability.  Findings of diagnostic evaluation and need for close outpatient reevaluation reviewed at length with patient prior to discharge.     I have reviewed the nursing notes. I have reviewed the findings, diagnosis, plan and need for follow up with the patient.    Diagnosis:  Atypical chest pain    Disposition:  Discharge to home with strict return precautions and recommended close outpatient primary care reevaluation  --  I, Dion Chambers, am serving as a trained medical scribe to document services personally performed by Cj Adame MD, based on the provider's statements to me.     Cj BO MD, was physically present and have reviewed and verified the accuracy of this note documented by Dion Chambers.    Cj Adame MD  [facility] EMERGENCY DEPARTMENT  1/24/2021     Cj Adame MD  01/24/21 1838       Cj Adame MD  01/24/21 1858

## 2021-01-24 NOTE — PROGRESS NOTES
"SUBJECTIVE:   Suzi Cruz is a 55 year old female presenting with a chief complaint of   Chief Complaint   Patient presents with     Urgent Care     Pt in clinic to have eval for chest pain and right hand/arm numbness and tingling.     Chest Pain     Numbness     She is an established patient of [facility].    Cardiac Event    She developed right sided chest pain last night which woke her up last night. Chest pain resolved this morning without intervention. Last night she developed numbness in her right arm and hand which is still present. Associated symptoms: fatigue, headache  Denies chest pain currently, shortness of breath, weakness, dizziness  Exacerbated by: nothing.  Relieved by: nothing..  No history of HTN, heart attack, stroke. Since COVID she has not been eating well or working out like she used to. Her mom has a history of TIA. Her brother has a history of blood clot. She has a history of Burkitt's lymphoma at age 20 and was on chemotherapy.    Problem list, Medication list, Allergies, and Medical history reviewed in EPIC.    ROS:  Review of systems negative except for noted above    OBJECTIVE  BP (!) 164/98   Pulse 99   Temp 97.4  F (36.3  C) (Oral)   Ht 1.664 m (5' 5.5\")   Wt 48.1 kg (106 lb)   SpO2 100%   BMI 17.37 kg/m      Physical Exam  Constitutional:       General: She is not in acute distress.     Appearance: She is not ill-appearing, toxic-appearing or diaphoretic.   HENT:      Head: Normocephalic and atraumatic.      Nose: Nose normal.      Mouth/Throat:      Mouth: Mucous membranes are moist.      Comments: Tongue midline  Eyes:      Extraocular Movements: Extraocular movements intact.      Pupils: Pupils are equal, round, and reactive to light.   Cardiovascular:      Rate and Rhythm: Normal rate and regular rhythm.      Pulses: Normal pulses.      Heart sounds: Normal heart sounds.   Pulmonary:      Effort: Pulmonary effort is normal. No respiratory distress.      Breath sounds: " Normal breath sounds. No wheezing, rhonchi or rales.   Chest:      Chest wall: No tenderness.   Musculoskeletal:      Right lower leg: No edema.      Left lower leg: No edema.   Lymphadenopathy:      Cervical: No cervical adenopathy.   Skin:     General: Skin is warm and dry.      Findings: No erythema.   Neurological:      General: No focal deficit present.      Mental Status: She is alert and oriented to person, place, and time.      Cranial Nerves: No cranial nerve deficit.      Sensory: No sensory deficit.      Motor: No weakness.      Coordination: Coordination normal.      Gait: Gait normal.   Psychiatric:         Speech: Speech is rapid and pressured.         Behavior: Behavior normal. Behavior is cooperative.       EKG completed showing normal sinus rhythm, rate 79 BPM, no ST elevations or depressions noted    ASSESSMENT:      ICD-10-CM    1. Numbness and tingling of right arm  R20.0     R20.2    2. Chest pain, unspecified type  R07.9 EKG 12-lead complete w/read - Clinics   3. Nonintractable headache, unspecified chronicity pattern, unspecified headache type  R51.9    4. Elevated blood pressure reading without diagnosis of hypertension  R03.0       PLAN:    Reassured that chest pain has resolved and normal EKG, neurologically intact. However with elevated blood pressure that is not normal for patient, headache, numbness and tingling in right arm that has persisted, and fatigue recommend further evaluation in emergency room now and patient is agreeable. She declines an ambulance and is discharged in stable condition.     Evelyn Gamino NP

## 2021-01-24 NOTE — ED TRIAGE NOTES
Pt arrived at wellness check on 1.20 and had high BP; Pt last night experienced some chest pain and went to urgent care this am.  EKG was performed and was normal.  Noticed fingertips are now numb and lab at  showed high cholesterol.

## 2021-01-25 LAB — INTERPRETATION ECG - MUSE: NORMAL

## 2021-01-25 NOTE — DISCHARGE INSTRUCTIONS
Please make an appointment to follow up with Your Primary Care Provider as soon as possible even if entirely better review recent symptoms and elevated blood pressure measurements.    Follow aftercare instructions regarding chest pain of uncertain cause.    Return immediately for worsening symptoms, directly to the emergency department.

## 2021-02-08 ENCOUNTER — HOSPITAL ENCOUNTER (OUTPATIENT)
Dept: CT IMAGING | Facility: HOSPITAL | Age: 56
Discharge: HOME | End: 2021-02-08
Attending: FAMILY MEDICINE

## 2021-02-08 DIAGNOSIS — Z85.43 HISTORY OF OVARIAN CANCER: ICD-10-CM

## 2021-02-08 DIAGNOSIS — R63.6 UNDERWEIGHT: ICD-10-CM

## 2021-02-12 ENCOUNTER — COMMUNICATION - HEALTHEAST (OUTPATIENT)
Dept: FAMILY MEDICINE | Facility: CLINIC | Age: 56
End: 2021-02-12

## 2021-02-19 ENCOUNTER — COMMUNICATION - HEALTHEAST (OUTPATIENT)
Dept: FAMILY MEDICINE | Facility: CLINIC | Age: 56
End: 2021-02-19

## 2021-02-19 ENCOUNTER — AMBULATORY - HEALTHEAST (OUTPATIENT)
Dept: FAMILY MEDICINE | Facility: CLINIC | Age: 56
End: 2021-02-19

## 2021-02-19 DIAGNOSIS — Q78.2 BONY SCLEROSIS: ICD-10-CM

## 2021-02-22 ENCOUNTER — TRANSFERRED RECORDS (OUTPATIENT)
Dept: HEALTH INFORMATION MANAGEMENT | Facility: CLINIC | Age: 56
End: 2021-02-22

## 2021-02-22 ENCOUNTER — RECORDS - HEALTHEAST (OUTPATIENT)
Dept: ADMINISTRATIVE | Facility: OTHER | Age: 56
End: 2021-02-22

## 2021-02-22 ENCOUNTER — COMMUNICATION - HEALTHEAST (OUTPATIENT)
Dept: FAMILY MEDICINE | Facility: CLINIC | Age: 56
End: 2021-02-22

## 2021-02-22 DIAGNOSIS — I83.90 VARICOSE VEINS OF LOWER EXTREMITY, UNSPECIFIED LATERALITY, UNSPECIFIED WHETHER COMPLICATED: ICD-10-CM

## 2021-03-12 ENCOUNTER — COMMUNICATION - HEALTHEAST (OUTPATIENT)
Dept: FAMILY MEDICINE | Facility: CLINIC | Age: 56
End: 2021-03-12

## 2021-03-22 ENCOUNTER — RECORDS - HEALTHEAST (OUTPATIENT)
Dept: MAMMOGRAPHY | Facility: CLINIC | Age: 56
End: 2021-03-22

## 2021-03-22 DIAGNOSIS — Z12.31 ENCOUNTER FOR SCREENING MAMMOGRAM FOR MALIGNANT NEOPLASM OF BREAST: ICD-10-CM

## 2021-04-02 ENCOUNTER — AMBULATORY - HEALTHEAST (OUTPATIENT)
Dept: FAMILY MEDICINE | Facility: CLINIC | Age: 56
End: 2021-04-02

## 2021-04-02 ENCOUNTER — COMMUNICATION - HEALTHEAST (OUTPATIENT)
Dept: FAMILY MEDICINE | Facility: CLINIC | Age: 56
End: 2021-04-02

## 2021-04-02 DIAGNOSIS — Q78.2 BONY SCLEROSIS: ICD-10-CM

## 2021-04-02 DIAGNOSIS — E03.9 HYPOTHYROIDISM: ICD-10-CM

## 2021-04-07 ENCOUNTER — COMMUNICATION - HEALTHEAST (OUTPATIENT)
Dept: FAMILY MEDICINE | Facility: CLINIC | Age: 56
End: 2021-04-07

## 2021-05-11 ENCOUNTER — COMMUNICATION - HEALTHEAST (OUTPATIENT)
Dept: FAMILY MEDICINE | Facility: CLINIC | Age: 56
End: 2021-05-11

## 2021-05-11 DIAGNOSIS — I83.90 VARICOSE VEINS OF LOWER EXTREMITY, UNSPECIFIED LATERALITY, UNSPECIFIED WHETHER COMPLICATED: ICD-10-CM

## 2021-05-20 ENCOUNTER — TRANSFERRED RECORDS (OUTPATIENT)
Dept: HEALTH INFORMATION MANAGEMENT | Facility: CLINIC | Age: 56
End: 2021-05-20

## 2021-05-26 ASSESSMENT — PATIENT HEALTH QUESTIONNAIRE - PHQ9: SUM OF ALL RESPONSES TO PHQ QUESTIONS 1-9: 1

## 2021-05-27 ASSESSMENT — PATIENT HEALTH QUESTIONNAIRE - PHQ9: SUM OF ALL RESPONSES TO PHQ QUESTIONS 1-9: 3

## 2021-05-28 ASSESSMENT — ANXIETY QUESTIONNAIRES: GAD7 TOTAL SCORE: 5

## 2021-05-30 VITALS — HEIGHT: 66 IN | WEIGHT: 102 LBS | BODY MASS INDEX: 16.39 KG/M2

## 2021-05-31 VITALS — WEIGHT: 108 LBS | BODY MASS INDEX: 17.54 KG/M2

## 2021-06-01 VITALS — WEIGHT: 103 LBS | HEIGHT: 66 IN | BODY MASS INDEX: 16.55 KG/M2

## 2021-06-01 NOTE — TELEPHONE ENCOUNTER
RN cannot approve Refill Request    RN can NOT refill this medication Protocol failed and NO refill given.       Lianne Bradford, [facility] Triage/Med Refill 9/5/2019    Requested Prescriptions   Pending Prescriptions Disp Refills     estradiol (ESTRACE) 2 MG tablet [Pharmacy Med Name: ESTRADIOL 2MG TABLETS] 90 tablet 3     Sig: TAKE ONE TABLET BY MOUTH ONE TIME DAILY       Hormone Replacement Therapy Refill Protocol Failed - 9/5/2019 10:16 AM        Failed - PCP or prescribing provider visit in past 12 months       Last office visit with prescriber/PCP: 4/5/2016 Tracy Jones MD OR same dept: Visit date not found OR same specialty: 1/30/2018 Yecenia David MD  Last physical: 4/17/2018 Last MTM visit: Visit date not found     Next visit within 3 mo: Visit date not found  Next physical within 3 mo: Visit date not found  Prescriber OR PCP: Tracy Jones MD  Last diagnosis associated with med order: 1. Menopausal and postmenopausal disorder  - estradiol (ESTRACE) 2 MG tablet [Pharmacy Med Name: ESTRADIOL 2MG TABLETS]; TAKE ONE TABLET BY MOUTH ONE TIME DAILY   Dispense: 90 tablet; Refill: 0     If protocol passes may refill for 3 months.

## 2021-06-02 ENCOUNTER — OFFICE VISIT (OUTPATIENT)
Dept: VASCULAR SURGERY | Facility: CLINIC | Age: 56
End: 2021-06-02
Payer: COMMERCIAL

## 2021-06-02 DIAGNOSIS — I83.811 VARICOSE VEINS OF RIGHT LOWER EXTREMITY WITH PAIN: Primary | ICD-10-CM

## 2021-06-02 PROCEDURE — 99203 OFFICE O/P NEW LOW 30 MIN: CPT | Performed by: SURGERY

## 2021-06-02 RX ORDER — CHOLECALCIFEROL (VITAMIN D3) 1250 MCG
1250 CAPSULE ORAL
COMMUNITY

## 2021-06-02 NOTE — PROGRESS NOTES
[facility] CONSULTATION    HPI:    Shelby Cruz is a 55 year old female who presents with complaints of right lower extremity pain and varicose veins.  She initially noticed unsightly telangiectasias on the lateral calf, and in the last two years or so, she has noticed a distended varicose vein over the medial thigh and anterior lower leg on the right. She has not noticed significant swelling. She has some aching and soreness in the leg, but this is partially mitigated or exacerbated by her usual exercise and work-outs. No burning or itching over the skin. Elevating her legs helps somewhat; heat seems to exacerbate symptoms.   She has started wearing compression stockings - on and off for about a month; she last wore them in March 2021. She did not notice significant improvement with the compression stockings. She initially established care with [facility], and then found that they were out-of-network for her insurance.    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 She recalls this being very painful with sciatica-like symptoms down her right leg, along with pain and pressure. Treatment for this was all during her age [age] with treatment of the Burkitt's.     She does have concerns related to lower left back and pelvic pain; this was reviewed with her PCP and she underwent a CT recently at [facility] (I did open the [facility] imaging and reviewed her CT with her form 2/8/21. She has a read of ill-defined sclerosis in L4, along with somewhat confusing surgical clips in the vicinity. Apparently MRI was considered for further imaging, but as there is no definitive answer as to whether her intracranial portacath has ferromagnetic metals, and given the surgical clips in the abdomen that may or may not predate non-ferromagnetic materials, she likely will not be able to  get MR imaging). I encouraged her to follow up with her PCP or potentially even re-establish with an oncologist for longitudinal care.       I have reviewed and updated the history.   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 Underwent bone marrow transplant, LOLITA/BSO, multiple rounds of chemotherapy (intrathecal and systemic), few rounds of radiation.  Right posterior flank incision from resection of vertebral tumor (?)   Intrathecal portacath (remains in place, right scalp)  Right chest portacath (removed)  LOLITA/BSO      Current Outpatient Medications   Medication Sig Dispense Refill     acetaminophen-codeine 300-30 MG per tablet Take 1-2 tablets by mouth every 4 hours as needed for mild pain 28 tablet 0     CALCIUM 600 + D 600-200 MG-UNIT OR TABS 2 tabs po per day 100 Tab 3     estradiol (ESTRACE) 2 MG tablet Take 1 tablet (2 mg) by mouth daily 90 tablet 0     levothyroxine (SYNTHROID) 112 MCG tablet Take 1 tablet (112 mcg) by mouth daily 90 tablet 0     MULTI-VITAMIN OR TABS ONE TABLET DAILY 100 Tab 3     ORDER FOR DME Equipment being ordered: crutches and air cast 1 Device 0       PAST SURGICAL HISTORY:   Past Surgical History:   Procedure Laterality Date     C DEXA, BONE DENSITY, AXIAL SKEL  12/05     HYSTERECTOMY TOTAL ABDOMINAL, BILATERAL SALPINGO-OOPHORECTOMY, COMBINED  1989     SURGICAL HISTORY OF -   1990    BMT, donated by brother     SURGICAL HISTORY OF -       cranial port-a cath       ALLERGIES:    Allergies   Allergen Reactions     No Known Drug Allergies        FAMILY HISTORY:   Family History   Problem Relation Age of Onset     Family History Negative No family hx of      Mother has varicose veins in the family.  "Brother had an unprovoked blood clot (DVT + PE); maternal side of the family has questionable history of clotting. No history of familial hemophilias.     SOCIAL HISTORY:   Social History     Tobacco Use     Smoking status: Never Smoker     Smokeless tobacco: Never Used   Substance Use Topics     Alcohol use: Yes     Comment: 3 drinks a week     Never smoker. Drinks alcohol: 2 cocktails a week or so; no illicit drug use. 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     REVIEW OF SYSTEMS:  10-pt ROS negative except as noted in HPI.       Vital signs:  There were no vitals taken for this visit.    PHYSICAL EXAM:  General: Sitting comfortably in chair, NAD. Thin.   HEENT: EOMI and conjugate, MMM   Respiratory: Normal respiratory effort.   Cardiovascular: Pulse is regular.   Musculoskeletal: Normal walking gait around exam room. Grossly normal and symmetric strength and ROM in BLE. No edema present.  Vascular: dorsalis pedis: palpable bilaterally; posterior tibial: palpable bilaterally.  Cap refill < 3 sec over feet/toes.  Veins: Mild medial right thigh and anterior lower leg varicosities present, arching to the lateral leg. Minimal scattered telangiectasias present.  Neurologic: A&Ox4  Psychiatric: Pleasantly conversant     Venous Duplex Ultrasound:   The report is available from a venous competency ultrasound done at [facility] (2/22/21); images are not available.   \"Right deep veins competent. Right great saphenous vein is incompetent from the saphenofemoral junction to the mid-thigh and at the level of hte knee. There is a tortuous incompetent branch off the right GSV in the mid-thigh that travesl anterior to the anterior lateral distal thigh; measures 5.4 mm with reflux time of 640 msec; second branch in the lateral knee measures 3.6 mm with reflux time of 1330 msec; third branch in lateral mid-calf measures 3.4 mm with reflux time of 4530 msec.\"      ASSESSMENT / " PLAN:  Shelby Cruz is a 55 year old female who presents with complaints of right leg varicose vein, with associated mild aching and soreness. This is exacerbated by her fitness regimen and is associated with focal burning along the venous varicosity.       Explained that I would like to get the images sent from [facility] to review her venous competency ultrasound    I explained that venous disease is not usually indicative of more severe or concerning underlying pathology, and does not need to be treated if it is not causing bothersome symptomatology.     I briefly discussed the pathophysiology of venous reflux and how that may be contributing to varicose veins     Recommended a trial of conservative therapy with 20-30 mmHg knee or thigh-high compression hose, to be worn daily. I encouraged her to wear these consistently.     She would prefer to follow-up later in summer or in early fall, given her currently busy schedule.     Will have Ms. Cruz follow up in clinic to evaluate response to conservative therapy and discuss further interventions if needed      Vale Domingo MD

## 2021-06-02 NOTE — LETTER
6/2/2021         RE: Shelby Cruz  [address]  [place] [address]        Dear Colleague,    Thank you for referring your patient, Shelby Cruz, to the [facility] [place]. Please see a copy of my visit note below.        [facility] CONSULTATION    HPI:    Shelby Cruz is a 55 year old female who presents with complaints of right lower extremity pain and varicose veins.  She initially noticed unsightly telangiectasias on the lateral calf, and in the last two years or so, she has noticed a distended varicose vein over the medial thigh and anterior lower leg on the right. She has not noticed significant swelling. She has some aching and soreness in the leg, but this is partially mitigated or exacerbated by her usual exercise and work-outs. No burning or itching over the skin. Elevating her legs helps somewhat; heat seems to exacerbate symptoms.   She has started wearing compression stockings - on and off for about a month; she last wore them in March 2021. She did not notice significant improvement with the compression stockings. She initially established care with [facility], and then found that they were out-of-network for her insurance.    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 She recalls this being very painful with sciatica-like symptoms down her right leg, along with pain and pressure. Treatment for this was all during her age 20-19 with treatment of the Burkitt's.     She does have concerns related to lower left back and pelvic pain; this was reviewed with her PCP and she underwent a CT recently at [facility] (I did open the [facility] imaging and reviewed her CT with her form 2/8/21. She has a read of ill-defined sclerosis in L4, along with somewhat confusing surgical clips in the vicinity. Apparently MRI was  considered for further imaging, but as there is no definitive answer as to whether her intracranial portacath has ferromagnetic metals, and given the surgical clips in the abdomen that may or may not predate non-ferromagnetic materials, she likely will not be able to get MR imaging). I encouraged her to follow up with her PCP or potentially even re-establish with an oncologist for longitudinal care.       I have reviewed and updated the history.   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 Underwent bone marrow transplant, LOLITA/BSO, multiple rounds of chemotherapy (intrathecal and systemic), few rounds of radiation.  Right posterior flank incision from resection of vertebral tumor (?)   Intrathecal portacath (remains in place, right scalp)  Right chest portacath (removed)  LOLITA/BSO      Current Outpatient Medications   Medication Sig Dispense Refill     acetaminophen-codeine 300-30 MG per tablet Take 1-2 tablets by mouth every 4 hours as needed for mild pain 28 tablet 0     CALCIUM 600 + D 600-200 MG-UNIT OR TABS 2 tabs po per day 100 Tab 3     estradiol (ESTRACE) 2 MG tablet Take 1 tablet (2 mg) by mouth daily 90 tablet 0     levothyroxine (SYNTHROID) 112 MCG tablet Take 1 tablet (112 mcg) by mouth daily 90 tablet 0     MULTI-VITAMIN OR TABS ONE TABLET DAILY 100 Tab 3     ORDER FOR DME Equipment being ordered: crutches and air cast 1 Device 0       PAST SURGICAL HISTORY:   Past Surgical History:   Procedure Laterality Date     C DEXA, BONE DENSITY, AXIAL SKEL  12/05     HYSTERECTOMY TOTAL ABDOMINAL, BILATERAL SALPINGO-OOPHORECTOMY, COMBINED  1989     SURGICAL HISTORY OF -   1990    BMT, donated by brother     SURGICAL HISTORY OF -        "cranial port-a cath       ALLERGIES:    Allergies   Allergen Reactions     No Known Drug Allergies        FAMILY HISTORY:   Family History   Problem Relation Age of Onset     Family History Negative No family hx of      Mother has varicose veins in the family. Brother had an unprovoked blood clot (DVT + PE); maternal side of the family has questionable history of clotting. No history of familial hemophilias.     SOCIAL HISTORY:   Social History     Tobacco Use     Smoking status: Never Smoker     Smokeless tobacco: Never Used   Substance Use Topics     Alcohol use: Yes     Comment: 3 drinks a week     Never smoker. Drinks alcohol: 2 cocktails a week or so; no illicit drug use. 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     REVIEW OF SYSTEMS:  10-pt ROS negative except as noted in HPI.       Vital signs:  There were no vitals taken for this visit.    PHYSICAL EXAM:  General: Sitting comfortably in chair, NAD. Thin.   HEENT: EOMI and conjugate, MMM   Respiratory: Normal respiratory effort.   Cardiovascular: Pulse is regular.   Musculoskeletal: Normal walking gait around exam room. Grossly normal and symmetric strength and ROM in BLE. No edema present.  Vascular: dorsalis pedis: palpable bilaterally; posterior tibial: palpable bilaterally.  Cap refill < 3 sec over feet/toes.  Veins: Mild medial right thigh and anterior lower leg varicosities present, arching to the lateral leg. Minimal scattered telangiectasias present.  Neurologic: A&Ox4  Psychiatric: Pleasantly conversant     Venous Duplex Ultrasound:   The report is available from a venous competency ultrasound done at [facility] (2/22/21); images are not available.   \"Right deep veins competent. Right great saphenous vein is incompetent from the saphenofemoral junction to the mid-thigh and at the level of hte knee. There is a tortuous incompetent branch off the right GSV in the mid-thigh that travesl anterior to the " "anterior lateral distal thigh; measures 5.4 mm with reflux time of 640 msec; second branch in the lateral knee measures 3.6 mm with reflux time of 1330 msec; third branch in lateral mid-calf measures 3.4 mm with reflux time of 4530 msec.\"      ASSESSMENT / PLAN:  Shelby Cruz is a 55 year old female who presents with complaints of right leg varicose vein, with associated mild aching and soreness. This is exacerbated by her fitness regimen and is associated with focal burning along the venous varicosity.       Explained that I would like to get the images sent from [facility] to review her venous competency ultrasound    I explained that venous disease is not usually indicative of more severe or concerning underlying pathology, and does not need to be treated if it is not causing bothersome symptomatology.     I briefly discussed the pathophysiology of venous reflux and how that may be contributing to varicose veins     Recommended a trial of conservative therapy with 20-30 mmHg knee or thigh-high compression hose, to be worn daily. I encouraged her to wear these consistently.     She would prefer to follow-up later in summer or in early fall, given her currently busy schedule.     Will have Ms. Cruz follow up in clinic to evaluate response to conservative therapy and discuss further interventions if needed      Vale Domingo MD        Again, thank you for allowing me to participate in the care of your patient.        Sincerely,        Vale Domingo MD  "

## 2021-06-03 VITALS
BODY MASS INDEX: 16.91 KG/M2 | SYSTOLIC BLOOD PRESSURE: 115 MMHG | HEIGHT: 66 IN | WEIGHT: 105.25 LBS | OXYGEN SATURATION: 100 % | DIASTOLIC BLOOD PRESSURE: 70 MMHG | HEART RATE: 81 BPM

## 2021-06-03 NOTE — PROGRESS NOTES
FEMALE ADULT PREVENTIVE EXAM    CHIEF COMPLAINT:  Female preventive exam.    SUBJECTIVE:  Shelby Cruz is a 54 y.o. female who presents for her routine physical exam.    Patient would like to address the following concerns today: Very stressed with her urine.  Long-term hormone replacement therapy for premature menopause, total hysterectomy at age 20, denies any adverse reaction.  Did try BuSpar for anxiety but she experienced insomnia and stopped taking it.      GYNE HISTORY  Menses: no  Sexually Active: yes  Contraception: no  Last Pap: na  Abnormal Pap: na  Vaginal Discharge: no      She  has a past medical history of antineoplastic chemotherapy and radiation therapy.    Lab Results   Component Value Date    WBC 4.8 11/25/2019    HGB 13.5 11/25/2019    HCT 40.0 11/25/2019     11/25/2019     11/25/2019     11/25/2019    K 4.2 11/25/2019    BUN 22 11/25/2019     Lab Results   Component Value Date    CHOL 187 11/25/2019    HDL 86 11/25/2019    LDLCALC 87 11/25/2019    TRIG 71 11/25/2019     Lab Results   Component Value Date    TSH 1.44 11/25/2019     BP Readings from Last 3 Encounters:   11/25/19 115/70   04/17/18 110/78   01/30/18 132/88       Surgeries:    Past Surgical History:   Procedure Laterality Date     HYSTERECTOMY       OOPHORECTOMY         Family History:  Her family history includes Alcohol abuse in her father and paternal grandfather; Arthritis in her father; Depression in her paternal grandfather; Hyperlipidemia in her father and paternal grandfather; Stroke in her paternal grandmother.    Social History:  She  reports that she has never smoked. She has never used smokeless tobacco. She reports current alcohol use.    Medications:    Current Outpatient Medications:      calcium carbonate-vitamin D3 (CALTRATE 600 PLUS D3) 600 mg(1,500mg) -400 unit per tablet, Take by mouth., Disp: , Rfl:      estradiol (ESTRACE) 2 MG tablet, TAKE ONE TABLET BY MOUTH ONE TIME DAILY, Disp:  90 tablet, Rfl: 0     levothyroxine (SYNTHROID) 112 MCG tablet, TAKE ONE TABLET BY MOUTH ONE TIME DAILY, Disp: 90 tablet, Rfl: 2     multivitamin therapeutic (THERAGRAN), Take by mouth., Disp: , Rfl:   HELD MEDICATIONS: None.    Allergies:  No latex allergies.  No Known Allergies         RISK BEHAVIOR & HEALTH HABITS  Self Breast Exam: yes.  Regular Exercise: yes.  Balanced diet: yes.  Seat Belt Use: yes  Calcium intake/Osteoporosis prevention: yes.    Guns: NA  Sun Screen: YES  Dental Care: YES    REVIEW OF SYSTEMS:  Complete head to toe review of systems is otherwise negative except as above.    OBJECTIVE:  VITAL SIGNS:    Vitals:    11/25/19 1335   BP: 115/70   Pulse: 81   SpO2: 100%     GENERAL:  Patient alert, in no acute distress.  EYES: PERRLA. Extraoccular movements intact, pupils equal, reactive to light and accommodation.  Normal conjunctiva and lids.    ENT:  Hearing grossly normal.  Normal appearance to ears and nose.  Bilateral TM s, external canals, oropharynx normal. Normal lips, gums and teeth.    NECK:  Supple, without thyromegaly or mass, no adenopathies.  RESP:  Clear to auscultation without crackles, wheezes or distress.  Normal respiratory effort.   CV:  Regular rate and rhythm without murmurs, rubs or gallops.  Normal pedal pulses.  No varicosities or edema.  ABDOMEN:  Soft, non-tender, without hepatosplenomegaly, masses, or hernias.   BREASTS:  declined    : declined   Rectal: declined   LYMPHATIC: Normal palpation of neck.  No lymphadenopathy.  No bruising.  NEURO:  CN II-XII intact, motor & sensory function all intact.  DTR and reflexes normal.  PSYCHIATRIC:  Alert & oriented with normal mood and affect.  Good judgment and insight.  SKIN:  Normal inspection and palpation.  MUSCULOSKELETAL: Normal gait and station.  - Spine / Ribs / Pelvis: Normal inspection, ROM, stability and strength: Spine, Head, Neck, Upper and Lower Extremities.    ASSESSMENT & PLAN  Shelby was seen today for annual  exam.    Diagnoses and all orders for this visit:    Visit for preventive health examination    Visit for screening mammogram  -     Mammo Screening Bilateral; Future    Hypothyroidism, unspecified type  -     Thyroid Cascade  -     T4, Free  -     HM1(CBC and Differential)  -     HM1 (CBC with Diff)    Screening for HIV without presence of risk factors  -     HIV Antigen/Antibody Screening [place]    Screening for endocrine, nutritional, metabolic and immunity disorder  -     Iron and Transferrin Iron Binding Capacity  -     Comprehensive Metabolic Panel  -     Lipid Cascade    Menopausal disorder  -     Estradiol    Side effect of long-term use of estrogen discussed included cardiovascular risk, we discussed slowly tapering over time.  General  Immunizations reviewed and updated .  Alcohol use, safety and moderation discussed.  Recommended adequate calcium, vitamin D intake/osteoporosis prevention.  Discussed colon cancer screening at age 50, 45 if -American.  Diet and exercise reviewed, including goal of aerobic exercise 30-90 minutes most days of the week, moderation of portions sizes, avoiding eating out and fast food and increase in fruits and vegetables.  Discussed safe sex practices.    Discussed & recommended seat belt (& motorcycle helmet) use.  Discussed & recommended smoke detector.  Discussed sun protection.  Discussed weight management.  Discussed self breast exam, screening mammogram timing.  The following low BMI interventions were performed this visit: prescribed diet education and weight gain advised    Tracy Jones MD

## 2021-06-05 VITALS
WEIGHT: 106 LBS | HEIGHT: 66 IN | BODY MASS INDEX: 17.04 KG/M2 | SYSTOLIC BLOOD PRESSURE: 159 MMHG | OXYGEN SATURATION: 99 % | HEART RATE: 73 BPM | DIASTOLIC BLOOD PRESSURE: 99 MMHG | RESPIRATION RATE: 12 BRPM

## 2021-06-06 NOTE — TELEPHONE ENCOUNTER
Refill Approved    Rx renewed per Medication Renewal Policy. Medication was last renewed on   Levothyroxine 10/23/18  Estradiol 9/5/19    Lachelle Monroy, [facility] Connection Triage/Med Refill 2/19/2020     Requested Prescriptions   Pending Prescriptions Disp Refills     levothyroxine (SYNTHROID, LEVOTHROID) 112 MCG tablet [Pharmacy Med Name: LEVOTHYROXINE 0.112MG (112MCG) TABS] 90 tablet 2     Sig: TAKE 1 TABLET BY MOUTH EVERY DAY.       Thyroid Hormones Protocol Passed - 2/16/2020  9:15 PM        Passed - Provider visit in past 12 months or next 3 months     Last office visit with prescriber/PCP: 4/5/2016 Tracy Jones MD OR same dept: Visit date not found OR same specialty: 1/30/2018 Yecenia David MD  Last physical: 11/25/2019 Last MTM visit: Visit date not found   Next visit within 3 mo: Visit date not found  Next physical within 3 mo: Visit date not found  Prescriber OR PCP: Tracy Jones MD  Last diagnosis associated with med order: 1. Hypothyroidism  - levothyroxine (SYNTHROID, LEVOTHROID) 112 MCG tablet [Pharmacy Med Name: LEVOTHYROXINE 0.112MG (112MCG) TABS]; TAKE 1 TABLET BY MOUTH EVERY DAY.  Dispense: 90 tablet; Refill: 2    2. Menopausal and postmenopausal disorder  - estradiol (ESTRACE) 2 MG tablet [Pharmacy Med Name: ESTRADIOL 2MG TABLETS]; TAKE 1 TABLET BY MOUTH DAILY  Dispense: 90 tablet; Refill: 0    If protocol passes may refill for 12 months if within 3 months of last provider visit (or a total of 15 months).             Passed - TSH on file in past 12 months for patient age 12 & older     TSH   Date Value Ref Range Status   11/25/2019 1.44 0.30 - 5.00 uIU/mL Final                   estradioL (ESTRACE) 2 MG tablet [Pharmacy Med Name: ESTRADIOL 2MG TABLETS] 90 tablet 0     Sig: TAKE 1 TABLET BY MOUTH DAILY       Hormone Replacement Therapy Refill Protocol Passed - 2/16/2020  9:15 PM        Passed - PCP or prescribing provider visit in past 12 months       Last office visit with  prescriber/PCP: 4/5/2016 Tracy Jones MD OR same dept: Visit date not found OR same specialty: 1/30/2018 Yecenia David MD  Last physical: 11/25/2019 Last MTM visit: Visit date not found     Next visit within 3 mo: Visit date not found  Next physical within 3 mo: Visit date not found  Prescriber OR PCP: Tracy Jones MD  Last diagnosis associated with med order: 1. Hypothyroidism  - levothyroxine (SYNTHROID, LEVOTHROID) 112 MCG tablet [Pharmacy Med Name: LEVOTHYROXINE 0.112MG (112MCG) TABS]; TAKE 1 TABLET BY MOUTH EVERY DAY.  Dispense: 90 tablet; Refill: 2    2. Menopausal and postmenopausal disorder  - estradiol (ESTRACE) 2 MG tablet [Pharmacy Med Name: ESTRADIOL 2MG TABLETS]; TAKE 1 TABLET BY MOUTH DAILY  Dispense: 90 tablet; Refill: 0     If protocol passes may refill for 3 months.

## 2021-06-08 NOTE — TELEPHONE ENCOUNTER
Refill Approved    Rx renewed per Medication Renewal Policy. Medication was last renewed on 2/19/20.    Lianne Bradford, [facility] Connection Triage/Med Refill 5/18/2020     Requested Prescriptions   Pending Prescriptions Disp Refills     estradioL (ESTRACE) 2 MG tablet [Pharmacy Med Name: ESTRADIOL 2MG TABLETS] 90 tablet 0     Sig: TAKE 1 TABLET BY MOUTH DAILY       Hormone Replacement Therapy Refill Protocol Passed - 5/15/2020 12:54 PM        Passed - PCP or prescribing provider visit in past 12 months       Last office visit with prescriber/PCP: Visit date not found OR same dept: Visit date not found OR same specialty: 1/30/2018 Yecenia David MD  Last physical: 11/25/2019 Last MTM visit: Visit date not found     Next visit within 3 mo: Visit date not found  Next physical within 3 mo: Visit date not found  Prescriber OR PCP: Tracy Jones MD  Last diagnosis associated with med order: 1. Menopausal and postmenopausal disorder  - estradioL (ESTRACE) 2 MG tablet [Pharmacy Med Name: ESTRADIOL 2MG TABLETS]; TAKE 1 TABLET BY MOUTH DAILY  Dispense: 90 tablet; Refill: 0     If protocol passes may refill for 3 months.

## 2021-06-10 NOTE — PROGRESS NOTES
FEMALE ADULT PREVENTIVE EXAM    CHIEF COMPLAINT:  Female preventive exam.    SUBJECTIVE:  Shelby Cruz is a 51 y.o. female who presents for her routine physical exam.    Patient has no concerns today. She has been stable on levothyroxine for several years, with no recent medication adjustments. Denies any fever, chills, diarrhea, constipation or tremors. Patient currently on Estradiol and has been stable with this medication since her SPO in 1988.  Patient has recently completed her mammogram, which was normal. She endorses doing self-breast exams monthly and has no concerns at this time. She has no  concerns today.  Patient exercises everyday and enjoys running, biking and lifting weights. Patient does not eat much meat and majority of her diet comes from fruits and vegetables. She is underweight but state she feels like she has a large BMI. She has been trying to eat more and gain weight.      She  has a past medical history of antineoplastic chemotherapy and radiation therapy.    Lab Results   Component Value Date    WBC 4.3 05/19/2017    HGB 14.4 05/19/2017    HCT 42.1 05/19/2017    MCV 98 05/19/2017     05/19/2017     05/19/2017    K 5.1 (H) 05/19/2017    BUN 19 05/19/2017     Lab Results   Component Value Date    CHOL 201 (H) 05/19/2017    HDL 84 05/19/2017    LDLCALC 101 05/19/2017    TRIG 79 05/19/2017     Lab Results   Component Value Date    TSH 2.77 05/19/2017     BP Readings from Last 3 Encounters:   05/19/17 118/78   04/05/16 94/58   02/10/15 102/70       Surgeries:    Past Surgical History:   Procedure Laterality Date     HYSTERECTOMY       OOPHORECTOMY         Family History:  Her family history includes Alcohol abuse in her father and paternal grandfather; Arthritis in her father; Depression in her paternal grandfather; Hyperlipidemia in her father and paternal grandfather; Stroke in her paternal grandmother.    Social History:  She  reports that she has never smoked. She does  not have any smokeless tobacco history on file.    Medications:    Current Outpatient Prescriptions:      calcium carbonate-vitamin D3 (CALTRATE 600 PLUS D3) 600 mg(1,500mg) -400 unit per tablet, Take by mouth., Disp: , Rfl:      estradiol (ESTRACE) 2 MG tablet, TAKE ONE TABLET BY MOUTH ONE TIME DAILY , Disp: 90 tablet, Rfl: 0     levothyroxine (SYNTHROID) 112 MCG tablet, TAKE ONE TABLET BY MOUTH ONE TIME DAILY, Disp: 30 tablet, Rfl: 0     multivitamin therapeutic (THERAGRAN), Take by mouth., Disp: , Rfl:   HELD MEDICATIONS: None.    Allergies:  No latex allergies.  No Known Allergies         RISK BEHAVIOR & HEALTH HABITS  Self Breast Exam: yes.  Regular Exercise: yes.  Balanced diet: yes.  Seat Belt Use: yes  Calcium intake/Osteoporosis prevention: yes.    Guns: NA  Sun Screen: YES  Dental Care: YES    REVIEW OF SYSTEMS:  Complete head to toe review of systems is otherwise negative except as above.    OBJECTIVE:  VITAL SIGNS:    Vitals:    05/19/17 0915   BP: 118/78   Pulse: 68   Resp: 13   Temp: 97.4  F (36.3  C)     GENERAL:  Patient alert, in no acute distress.  EYES: PERRLA. Extraoccular movements intact, pupils equal, reactive to light and accommodation.  Normal conjunctiva and lids.    ENT:  Hearing grossly normal.  Normal appearance to ears and nose.  Bilateral TM s, external canals, oropharynx normal. Normal lips, gums and teeth.    NECK:  Supple, without thyromegaly or mass, no adenopathies.  RESP:  Clear to auscultation without crackles, wheezes or distress.  Normal respiratory effort.   CV:  Regular rate and rhythm without murmurs, rubs or gallops.  Normal pedal pulses.  No varicosities or edema.  ABDOMEN:  Soft, non-tender, without hepatosplenomegaly, masses, or hernias.   BREASTS:  deferred  : deferred  Rectal: deferred  LYMPHATIC: Normal palpation of neck.  No lymphadenopathy.  No bruising.  NEURO:  CN II-XII intact, motor & sensory function all intact.  DTR and reflexes normal.  PSYCHIATRIC:  Alert  & oriented with normal mood and affect.  Good judgment and insight.  SKIN:  Normal inspection and palpation.  MUSCULOSKELETAL: Normal gait and station.  - Spine / Ribs / Pelvis: Normal inspection, ROM, stability and strength: Spine, Head, Neck, Upper and Lower Extremities.    ASSESSMENT & PLAN  Shelby was seen today for annual exam, medication refill, medication education visit and hypothyroidism.    Diagnoses and all orders for this visit:    Visit for preventive health examination    Underweight  -     Comprehensive Metabolic Panel  -     Thyroid Stimulating Hormone (TSH)  -     T4, Free  -     T3, Total  -     HM1(CBC and Differential)  -     Lipid Cascade FASTING  -     Vitamin D, Total (25-Hydroxy)  -     Vitamin B12  -     Ferritin  -     HM1 (CBC with Diff)  -     DXA Bone Density Scan; Future    Hypothyroidism  -     Comprehensive Metabolic Panel  -     Thyroid Stimulating Hormone (TSH)  -     T4, Free  -     T3, Total  -     HM1(CBC and Differential)  -     Lipid Cascade FASTING  -     Vitamin D, Total (25-Hydroxy)  -     Vitamin B12  -     Ferritin  -     HM1 (CBC with Diff)  -     DXA Bone Density Scan; Future    Screening for endocrine, nutritional, metabolic and immunity disorder  -     Comprehensive Metabolic Panel  -     Thyroid Stimulating Hormone (TSH)  -     T4, Free  -     T3, Total  -     HM1(CBC and Differential)  -     Lipid Cascade FASTING  -     Vitamin D, Total (25-Hydroxy)  -     Vitamin B12  -     Ferritin  -     HM1 (CBC with Diff)    Osteopenia  -     DXA Bone Density Scan; Future    We have discussed about possibly tapering down the estrogen over time,  get a bone density, Screening colonoscopy next year,encouraged weight gain program.  General  Immunizations reviewed and updated .  Alcohol use, safety and moderation discussed.  Recommended adequate calcium, vitamin D intake/osteoporosis prevention.  Discussed colon cancer screening at age 50, 45 if -American.  Diet and exercise  reviewed, including goal of aerobic exercise 30-90 minutes most days of the week, moderation of portions sizes, avoiding eating out and fast food and increase in fruits and vegetables.    Discussed & recommended seat belt (& motorcycle helmet) use.  Discussed & recommended smoke detector.  Discussed sun protection.  Discussed weight management.  Discussed self breast exam, screening mammogram timing.  The following low BMI interventions were performed this visit: weight gain advised, dietary management education, guidance, and counseling, dietary education for weight gain and lifestyle education regarding diet    Tracy Jones MD

## 2021-06-14 NOTE — PROGRESS NOTES
FEMALE ADULT PREVENTIVE EXAM    CHIEF COMPLAINT:  Female preventive exam.    SUBJECTIVE:  Shelby Cruz is a 55 y.o. female who presents for her routine physical exam.    Patient would like to address the following concerns today: Stress and anxiety recently, blood pressure mildly elevated BP  today but no chest pain, shortness of breath or dizziness.  Has been on early menopause since 1988 for ovarian cancer and  Burkitt lymphoma treatment, status post chemotherapy, currently taking estradiol 2 mg daily.  Hypothyroidism: Controlled levothyroxine..  She has mild calcification in the left breast 2 Dmammogram. A follow-up 3D mammogram only revealed breast milk calcification.  She is due for a follow-up in March of this year.  GYNE HISTORY  Menses: no  Sexually Active: na  Last Pap: na  Abnormal Pap: no  Vaginal Discharge: no      She  has a past medical history of antineoplastic chemotherapy and radiation therapy.    Lab Results   Component Value Date    WBC 5.1 01/20/2021    HGB 13.7 01/20/2021    HCT 39.2 01/20/2021    MCV 97 01/20/2021     01/20/2021     11/25/2019    K 4.2 11/25/2019    BUN 22 11/25/2019     Lab Results   Component Value Date    CHOL 187 11/25/2019    HDL 86 11/25/2019    LDLCALC 87 11/25/2019    TRIG 71 11/25/2019     Lab Results   Component Value Date    TSH 1.44 11/25/2019     BP Readings from Last 3 Encounters:   01/20/21 (!) 159/99   11/25/19 115/70   04/17/18 110/78       Surgeries:    Past Surgical History:   Procedure Laterality Date     HYSTERECTOMY       OOPHORECTOMY         Family History:  Her family history includes Alcohol abuse in her father and paternal grandfather; Arthritis in her father; Depression in her paternal grandfather; Hyperlipidemia in her father and paternal grandfather; Stroke in her paternal grandmother.    Social History:  She  reports that she has never smoked. She has never used smokeless tobacco. She reports current alcohol  use.    Medications:    Current Outpatient Medications:      calcium carbonate-vitamin D3 (CALTRATE 600 PLUS D3) 600 mg(1,500mg) -400 unit per tablet, Take by mouth., Disp: , Rfl:      estradioL (ESTRACE) 2 MG tablet, TAKE 1 TABLET BY MOUTH DAILY, Disp: 90 tablet, Rfl: 0     levothyroxine (SYNTHROID, LEVOTHROID) 112 MCG tablet, TAKE 1 TABLET BY MOUTH EVERY DAY., Disp: 90 tablet, Rfl: 2     multivitamin therapeutic (THERAGRAN), Take by mouth., Disp: , Rfl:   HELD MEDICATIONS: None.    Allergies:  No latex allergies.  No Known Allergies         RISK BEHAVIOR & HEALTH HABITS  Self Breast Exam: yes.  Regular Exercise: yes.  Balanced diet: yes.  Seat Belt Use: yes  Calcium intake/Osteoporosis prevention: yes.    Guns: NA  Sun Screen: YES  Dental Care: YES    REVIEW OF SYSTEMS:  Complete head to toe review of systems is otherwise negative except as above.    OBJECTIVE:  VITAL SIGNS:    Vitals:    01/20/21 1055   BP: (!) 159/99   Pulse: 73   Resp:    SpO2:      GENERAL:  Patient alert, in no acute distress.  EYES: PERRLA. Extraoccular movements intact, pupils equal, reactive to light and accommodation.  Normal conjunctiva and lids.    ENT:  Hearing grossly normal.  Normal appearance to ears and nose.  Bilateral TM s, external canals, oropharynx normal. Normal lips, gums and teeth.    NECK:  Supple, without thyromegaly or mass, no adenopathies.  RESP:  Clear to auscultation without crackles, wheezes or distress.  Normal respiratory effort.   CV:  Regular rate and rhythm without murmurs, rubs or gallops.  Normal pedal pulses.  No varicosities or edema.  ABDOMEN:  Soft, non-tender, without hepatosplenomegaly, masses, or hernias.   BREASTS:  declined.    :  deferred  Rectal:deferred  LYMPHATIC: Normal palpation of neck.  No lymphadenopathy.  No bruising.  NEURO:  CN II-XII intact, motor & sensory function all intact.  DTR and reflexes normal.  PSYCHIATRIC:  Alert & oriented with normal mood and affect.  Good judgment and  insight.  SKIN:  Normal inspection and palpation.  MUSCULOSKELETAL: Normal gait and station.  - Spine / Ribs / Pelvis: Normal inspection, ROM, stability and strength: Spine, Head, Neck, Upper and Lower Extremities.    ASSESSMENT & PLAN  Shelby was seen today for annual exam.    Diagnoses and all orders for this visit:    Visit for preventive health examination    Menopausal disorder  -     Estradiol    Hypothyroidism, unspecified type  -     Thyroid Cascade  -     T4, Free    Underweight    Encounter for HCV screening test for low risk patient  -     Hepatitis C Antibody (Anti-HCV)    Elevated blood-pressure reading, without diagnosis of hypertension  -     Microalbumin, Random Urine    Screening for endocrine, nutritional, metabolic and immunity disorder  -     HM2(CBC w/o Differential)  -     Comprehensive Metabolic Panel  -     Lipid Cascade    Elevated blood pressure, check blood pressure outside of the clinic 3 times a week, keep a log, follow-up in about 4 weeks.  Continue healthy lifestyle changes.  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  Risk and benefit discussed.  Hypothyroidism stable on levothyroxine.  Menopausal symptoms, currently on estrogen 2 mg a day discussed consider lowering the arm over time to minimize risk of cardiovascular disease.    General  Immunizations reviewed and updated .  Alcohol use, safety and moderation discussed.  Recommended adequate calcium, vitamin D intake/osteoporosis prevention.  Discussed colon cancer screening at age 50, 45 if -American.  Diet and exercise reviewed, including goal of aerobic exercise 30-90 minutes most days of the week, moderation of portions sizes, avoiding eating out and fast food and increase in fruits and vegetables.  Discussed safe sex practices.    Discussed & recommended seat belt (& motorcycle helmet) use.  Discussed & recommended smoke detector.  Discussed sun  protection.  Discussed weight management.  Discussed self breast exam, screening mammogram timing.  I have had an Advance Directives discussion with the patient.  The following low BMI interventions were performed this visit: weight gain advised and dietary education for weight gain    Tracy Jones MD

## 2021-06-14 NOTE — TELEPHONE ENCOUNTER
Refill Approved    Rx renewed per Medication Renewal Policy. Medication was last renewed on 5/18/20.    Lianne Bradford, [facility] Triage/Med Refill 1/22/2021     Requested Prescriptions   Pending Prescriptions Disp Refills     estradioL (ESTRACE) 2 MG tablet [Pharmacy Med Name: ESTRADIOL 2MG TABLETS] 90 tablet 0     Sig: TAKE 1 TABLET BY MOUTH DAILY       Hormone Replacement Therapy Refill Protocol Passed - 1/22/2021  3:42 AM        Passed - PCP or prescribing provider visit in past 12 months       Last office visit with prescriber/PCP: Visit date not found OR same dept: Visit date not found OR same specialty: 1/30/2018 Yecenia David MD  Last physical: 1/20/2021 Last MTM visit: Visit date not found     Next visit within 3 mo: Visit date not found  Next physical within 3 mo: Visit date not found  Prescriber OR PCP: Tracy Jones MD  Last diagnosis associated with med order: 1. Menopausal and postmenopausal disorder  - estradioL (ESTRACE) 2 MG tablet [Pharmacy Med Name: ESTRADIOL 2MG TABLETS]; TAKE 1 TABLET BY MOUTH DAILY  Dispense: 90 tablet; Refill: 0     If protocol passes may refill for 3 months.

## 2021-06-15 NOTE — PROGRESS NOTES
Assessment and Plan    1. Anxiety  This is becoming uncomfortable for Shelby and counseling has not helped - trial of   - citalopram (CELEXA) 20 MG tablet; Take 1 tablet (20 mg total) by mouth daily. Start with half a pill for a week, then increase to one pill daily  Dispense: 30 tablet; Refill: 2    2. Situational stress  I urged her to look into resources for help with dealing with her children and finding balance about her concerns. There may be parent groups for kids with ADHD or other cognitive difficulties - she should also talk to her pediatrician.  She several ways that she tries to relax and get away from things, which I applauded and encourage her to continue (puzzles, reading books)    I found this group:  https://www.[employer]/resources/for-families/add-adhd-parent-support-group/    follow up in 3-4 weeks    Yecenia David MD     -------------------------------------------    Chief Complaint   Patient presents with     Anxiety     saw pschologist, not really helping, would like to try medication for anxiety.      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  She feels fine about things during the summer but she is particularly worried how they are doing in school : one in school as an IEP- slow auto processing and difficulties with math and reading; the other has a 504 plan (?) for  ADHD.  Her anxiety is uncomfortable, she feels more reactive than she would like, but also helpless about knowing what to do. She had been going  psychologist , but gave up on this because this counselor seemed to be doing all the talking -Shelby is not sure it was a great fit.     She has other sources of stress:Her father has alcohol abuse and she started going to [facility] - didn't feel good coming out of it.  Also her  has prostate cancer, though he has gotten appropriate treatment. Also her  decided to buy three duplexes as part of an investment, and they don't manage  the duplexes, but it still feels like a burden    Regarding her children, it is particularly her son who is in 9th grade that she is worried about.  He is floundering, spending too much time plugged in, his teachers don't intervene when he is off on his own with his Ipad. She is also worried he is not getting enough sleep     CAROLYN-7 Total: 13 (1/30/2018  2:00 PM)  No Data Recorded       Patient Active Problem List   Diagnosis     Neurogenic Bladder     Hypothyroidism     Ankle Joint Pain     Skin Condition     Menopausal disorder     Tinnitus     Underweight       Current Outpatient Prescriptions on File Prior to Visit   Medication Sig Dispense Refill     calcium carbonate-vitamin D3 (CALTRATE 600 PLUS D3) 600 mg(1,500mg) -400 unit per tablet Take by mouth.       estradiol (ESTRACE) 2 MG tablet TAKE ONE TABLET BY MOUTH ONE TIME DAILY--NEED TO SCHEDULE APPT BEFORE NEXT REFILL 90 tablet 2     levothyroxine (SYNTHROID) 112 MCG tablet TAKE ONE TABLET BY MOUTH ONE TIME DAILY 90 tablet 2     multivitamin therapeutic (THERAGRAN) Take by mouth.       No current facility-administered medications on file prior to visit.        History   Smoking Status     Never Smoker   Smokeless Tobacco     Not on file       History   Alcohol use Not on file       Vitals:    01/30/18 1417   BP: 132/88   Pulse: 72   SpO2: 97%     Body mass index is 17.54 kg/(m^2).     EXAM:    General appearance - alert, well appearing, and in no distress  Mental status - alert, oriented to person, place, and time, anxious mood;  Normal  behavior,  speech, dress, motor activity, and thought processes

## 2021-06-15 NOTE — TELEPHONE ENCOUNTER
Reason for Call: Request for an order or referral:    Order or referral being requested: pt would like [facility], she saw them a year ago and recently they discovered a deep varicose vein, she cannot do any cosmetic surgery until this is issue is addressed.    Date needed: at your convenience    Has the patient been seen by the PCP for this problem? YES    Additional comments: n/a    Phone number Patient can be reached at:  Home number on file [phone] (home)    Best Time:  anytime    Can we leave a detailed message on this number?  Yes    Call taken on 2/22/2021 at 2:39 PM by Laura Montaño

## 2021-06-16 NOTE — TELEPHONE ENCOUNTER
Refill Approved    Rx renewed per Medication Renewal Policy. Medication was last renewed on 2/19/20, last OV 1/20/21.    Myesha Vasquez, [facility] Triage/Med Refill 4/4/2021     Requested Prescriptions   Pending Prescriptions Disp Refills     levothyroxine (SYNTHROID, LEVOTHROID) 112 MCG tablet [Pharmacy Med Name: LEVOTHYROXINE 0.112MG (112MCG) TABS] 90 tablet 2     Sig: TAKE 1 TABLET BY MOUTH EVERY DAY       Thyroid Hormones Protocol Passed - 4/2/2021 10:35 AM        Passed - Provider visit in past 12 months or next 3 months     Last office visit with prescriber/PCP: Visit date not found OR same dept: Visit date not found OR same specialty: 1/30/2018 Yecenia David MD  Last physical: 1/20/2021 Last MTM visit: Visit date not found   Next visit within 3 mo: Visit date not found  Next physical within 3 mo: Visit date not found  Prescriber OR PCP: Tracy Jones MD  Last diagnosis associated with med order: 1. Hypothyroidism  - levothyroxine (SYNTHROID, LEVOTHROID) 112 MCG tablet [Pharmacy Med Name: LEVOTHYROXINE 0.112MG (112MCG) TABS]; TAKE 1 TABLET BY MOUTH EVERY DAY.  Dispense: 90 tablet; Refill: 2    If protocol passes may refill for 12 months if within 3 months of last provider visit (or a total of 15 months).             Passed - TSH on file in past 12 months for patient age 12 & older     TSH   Date Value Ref Range Status   01/20/2021 4.08 0.30 - 5.00 uIU/mL Final

## 2021-06-17 NOTE — PROGRESS NOTES
FEMALE ADULT PREVENTIVE EXAM    CHIEF COMPLAINT:  Female preventive exam.    SUBJECTIVE:  Shelby Cruz is a 52 y.o. female who presents for her routine physical exam.    Patient would like to address the following concerns today: Mild anxiety, initially prescribed Celexa, but did have some gastrointestinal side effects after a few doses, discussed about other treatment option.  Anxiety usually exacerbated with stress at home, 2 kids with ADHD having hard time at school.  Painful left shoulder bone spur, catching on the seatbelt quite often, no specific injury in that area.    GYNE HISTORY  Menses: no  Sexually Active: yes  Contraception: no  Last Pap: na (h/o  Hysterectomy)  Abnormal Pap: no  Vaginal Discharge: no      She  has a past medical history of antineoplastic chemotherapy and radiation therapy.    Lab Results   Component Value Date    WBC 4.3 05/19/2017    HGB 14.4 05/19/2017    HCT 42.1 05/19/2017    MCV 98 05/19/2017     05/19/2017     05/19/2017    K 5.1 (H) 05/19/2017    BUN 19 05/19/2017     Lab Results   Component Value Date    CHOL 201 (H) 05/19/2017    HDL 84 05/19/2017    LDLCALC 101 05/19/2017    TRIG 79 05/19/2017     Lab Results   Component Value Date    TSH 2.77 05/19/2017     BP Readings from Last 3 Encounters:   04/17/18 110/78   01/30/18 132/88   05/19/17 118/78       Surgeries:    Past Surgical History:   Procedure Laterality Date     HYSTERECTOMY       OOPHORECTOMY         Family History:  Her family history includes Alcohol abuse in her father and paternal grandfather; Arthritis in her father; Depression in her paternal grandfather; Hyperlipidemia in her father and paternal grandfather; Stroke in her paternal grandmother.    Social History:  She  reports that she has never smoked. She does not have any smokeless tobacco history on file.    Medications:    Current Outpatient Prescriptions:      calcium carbonate-vitamin D3 (CALTRATE 600 PLUS D3) 600 mg(1,500mg) -400  unit per tablet, Take by mouth., Disp: , Rfl:      estradiol (ESTRACE) 2 MG tablet, TAKE ONE TABLET BY MOUTH ONE TIME DAILY--NEED TO SCHEDULE APPT BEFORE NEXT REFILL, Disp: 90 tablet, Rfl: 2     levothyroxine (SYNTHROID) 112 MCG tablet, TAKE ONE TABLET BY MOUTH ONE TIME DAILY, Disp: 90 tablet, Rfl: 2     multivitamin therapeutic (THERAGRAN), Take by mouth., Disp: , Rfl:      busPIRone (BUSPAR) 5 MG tablet, Take 1 tablet (5 mg total) by mouth daily., Disp: 30 tablet, Rfl: 3  HELD MEDICATIONS: None.    Allergies:  No latex allergies.  No Known Allergies         RISK BEHAVIOR & HEALTH HABITS  Self Breast Exam: yes.  Recent mammogram was negative, but was told that she needs to get a 3 D mammogram because of dense breast tissue.  Regular Exercise: yes.  Balanced diet: yes.  Seat Belt Use: yes  Calcium intake/Osteoporosis prevention: yes.    Guns: NA  Sun Screen: YES  Dental Care: YES    REVIEW OF SYSTEMS:  Complete head to toe review of systems is otherwise negative except as above.    OBJECTIVE:  VITAL SIGNS:    Vitals:    04/17/18 1014   BP: 110/78   Pulse: 98   SpO2: 98%     GENERAL:  Patient alert, in no acute distress.  EYES: PERRLA. Extraoccular movements intact, pupils equal, reactive to light and accommodation.  Normal conjunctiva and lids.    ENT:  Hearing grossly normal.  Normal appearance to ears and nose.  Bilateral TM s, external canals, oropharynx normal. Normal lips, gums and teeth.    NECK:  Supple, without thyromegaly or mass, no adenopathies.  RESP:  Clear to auscultation without crackles, wheezes or distress.  Normal respiratory effort.   CV:  Regular rate and rhythm without murmurs, rubs or gallops.  Normal pedal pulses.  No varicosities or edema.  ABDOMEN:  Soft, non-tender, without hepatosplenomegaly, masses, or hernias.   BREASTS:    deferred  : deferred  Rectal: deferred  LYMPHATIC: Normal palpation of neck.  No lymphadenopathy.  No bruising.  NEURO:  CN II-XII intact, motor & sensory function  all intact.  DTR and reflexes normal.  PSYCHIATRIC:  Alert & oriented with normal mood and affect.  Good judgment and insight.  SKIN:  Normal inspection and palpation.  MUSCULOSKELETAL: Normal gait and station.  - Spine / Ribs / Pelvis: Normal inspection, ROM, stability and strength: Spine, Head, Neck, Upper and Lower Extremities.    ASSESSMENT & PLAN  Shelby was seen today for annual exam.    Diagnoses and all orders for this visit:    Screen for colon cancer  -     Ambulatory referral for Colonoscopy    Menopausal disorder  -     Estradiol    Hypothyroidism  -     Thyroid Stimulating Hormone (TSH)  -     T4, Free  -     T3, Total    Visit for preventive health examination    Shoulder lesion, left  -     XR Shoulder Left 2 or More VWS; Future  -     Ambulatory referral to Orthopedics  X-ray independently reviewed did show bone spur, degenerative joint disease of the AC joint area.  Screening for endocrine, nutritional, metabolic and immunity disorder  -     Comprehensive Metabolic Panel  -     Lipid Cascade FASTING    Anxiety  -     busPIRone (BUSPAR) 5 MG tablet; Take 1 tablet (5 mg total) by mouth daily.    Other orders  -     Tdap vaccine,  8yo or older,  IM  For bone spur left shoulder, referral to [facility].  Anxiety, trial of BuSpar, possible side effect discussed.  General  Immunizations reviewed and updated .  Alcohol use, safety and moderation discussed.  Recommended adequate calcium, vitamin D intake/osteoporosis prevention.  Discussed colon cancer screening at age 50, 45 if -American.  Diet and exercise reviewed, including goal of aerobic exercise 30-90 minutes most days of the week, moderation of portions sizes, avoiding eating out and fast food and increase in fruits and vegetables.  Discussed safe sex practices.    Discussed & recommended seat belt (& motorcycle helmet) use.  Discussed & recommended smoke detector.  Discussed sun protection.  Discussed weight management.  Discussed self  breast exam, screening mammogram timing.  The following are part of a depression follow up plan for the patient:  coping support assessment, coping support management, emotional support assessment, emotional support education and emotional support management    Tracy Jones MD

## 2021-06-17 NOTE — PATIENT INSTRUCTIONS - HE
Patient Instructions by Tracy Jones MD at 11/25/2019  1:20 PM     Author: Tracy Jones MD Service: -- Author Type: Physician    Filed: 11/26/2019 12:31 PM Encounter Date: 11/25/2019 Status: Signed    : Tracy Jones MD (Physician)       Patient Education     Prevention Guidelines, Women Ages 50 to 64  Screening tests and vaccines are an important part of managing your health. A screening test is done to find possible disorders or diseases in people who don't have any symptoms. The goal is to find a disease early so lifestyle changes can be made and you can be watched more closely to reduce the risk of disease, or to detect it early enough to treat it most effectively. Screening tests are not considered diagnostic, but are used to determine if more testing is needed. Health counseling is essential, too. Below are guidelines for these, for women ages 50 to 64. Talk with your healthcare provider to make sure youre up to date on what you need.  Screening Who needs it How often   Type 2 diabetes or prediabetes All women beginning at age 45 and women without symptoms at any age who are overweight or obese and have 1 or more additional risk factors for diabetes. At  least every 3 years   Type 2 diabetes or prediabetes All women diagnosed with gestational diabetes Lifelong testing every 3 years   Type 2 diabetes All women with prediabetes Every year   Alcohol misuse All women in this age group At routine exams   Blood pressure All women in this age group Yearly checkup if your blood pressure is normal  Normal blood pressure is less than 120/80 mm Hg  If your blood pressure reading is higher than normal, follow the advice of your healthcare provider   Breast cancer All women at average risk in this age group Yearly mammogram should be done until age 54. At age 55, you can switch to every other year or choose to continue yearly.  All women should know the possible benefits and  risks of breast cancer screening with mammograms.   Cervical cancer All women in this age group, except women who have had a complete hysterectomy Pap test every 3 years or Pap test with human papillomavirus (HPV) test every 5 years   Chlamydia Women at increased risk for infection At routine exams   Colorectal cancer All women at average risk in this age group Multiple tests are available and are used at different times. Possible tests include:    Flexible sigmoidoscopy every 5 years, or    Colonoscopy every 10 years, or    CT colonography (virtual colonoscopy) every 5 years, or    Yearly fecal occult blood test, or    Yearly fecal immunochemical test every year, or    Stool DNA test, every 3 years  If you choose a test other than a colonoscopy and have an abnormal test result, you will need to follow up with a colonoscopy. Screening advice varies among expert groups. Talk with your healthcare provider about which tests are best for you.  Some people should be screened using a different schedule because of their personal or family health history. Talk with your healthcare provider about your health history.   Depression All women in this age group At routine exams   Gonorrhea Sexually active women at increased risk for infection At routine exams   Hepatitis C Anyone at increased risk; 1 time for those born between 1945 and 1965 At routine exams   High cholesterol or triglycerides All women in this age group who are at risk for coronary artery disease At least every 5 years   HIV All women At routine exams   Lung cancer Adults age 55 to 80 who have smoked Yearly screening in smokers with 30 pack-year history of smoking or who quit within 15 years   Obesity All women in this age group At routine exams   Osteoporosis Women who are postmenopausal Ask your healthcare provider   Syphilis Women at increased risk for infection - talk with your healthcare provider At routine exams   Tuberculosis Women at increased risk for  infection - talk with your healthcare provider Ask your healthcare provider   Vision All women in this age group Ask your healthcare provider   Vaccine Who needs it How often   Chickenpox (varicella) All women in this age group who have no record of this infection or vaccine 2 doses; the second dose should be given at least 4 weeks after the first dose   Hepatitis A Women at increased risk for infection - talk with your healthcare provider 2 doses given at least 6 months apart   Hepatitis B Women at increased risk for infection - talk with your healthcare provider 3 doses over 6 months; second dose should be given 1 month after the first dose; the third dose should be given at least 2 months after the second dose and at least 4 months after the first dose   Haemophilus influenzae Type B (HIB) Women at increased risk for infection - talk with your healthcare provider 1 to 3 doses   Influenza (flu) All women in this age group Once a year   Measles, mumps, rubella (MMR) Women in this age group through their late 50s who have no record of these infections or vaccines 1 dose   Meningococcal Women at increased risk for infection - talk with your healthcare provider 1 or more doses   Pneumococcal conjugate vaccine (PCV13) and pneumococcal polysaccharide vaccine (PPSV23) Women at increased risk for infection - talk with your healthcare provider PCV13: 1 dose ages 19 to 65 (protects against 13 types of pneumococcal bacteria)  PPSV23: 1 to 2 doses through age 64, or 1 dose at 65 or older (protects against 23 types of pneumococcal bacteria)   Tetanus/diphtheria/pertussis (Td/Tdap) booster All women in this age group Td every 10 years, or a 1-time dose of Tdap instead of a Td booster after age 18, then Td every 10 years   Zoster All women ages 60 and older 1 dose   Counseling Who needs it How often   BRCA gene mutation testing for breast and ovarian cancer susceptibility Women with increased risk for having gene mutation When  your risk is known   Breast cancer and chemoprevention Women at high risk for breast cancer When your risk is known   Diet and exercise Women who are overweight or obese When diagnosed, and then at routine exams   Sexually transmitted infection prevention Women at increased risk for infection - talk with your healthcare provider At routine exams   Use of daily aspirin Women ages 55 and up in this age group who are at risk for cardiovascular health problems such as stroke When your risk is known   Use of tobacco and the health effects it can cause All women in this age group Every exam   1 [employer]  Date Last Reviewed: 1/26/2016 2000-2019 [employer]. [address] [place] [id number]. All rights reserved. This information is not intended as a substitute for professional medical care. Always follow your healthcare professional's instructions.

## 2021-06-21 NOTE — LETTER
Letter by Tracy Jones MD at      Author: Tracy Jones MD Service: -- Author Type: --    Filed:  Encounter Date: 2/12/2021 Status: (Other)         Shelby Cruz  [address] [place] [address]             February 12, 2021         Dear MsAnabel Cruz,    Below are the results from your recent visit:    Resulted Orders   CT Abdomen Pelvis With Oral Without IV Contrast    Narrative    EXAM: CT ABDOMEN PELVIS W ORAL WO IV CONTRAST  LOCATION: [facility]  DATE/TIME: 2/8/2021 2:15 PM    INDICATION: Renal failure, chronic Abdominal pain, weight loss ovarian cancer and  Burkitt lymphoma treatment, status post chemotherapy,abnormal kidney function  COMPARISON: None.  TECHNIQUE: CT scan of the abdomen and pelvis was performed without IV contrast. Multiplanar reformats were obtained. Dose reduction techniques were used.  CONTRAST: 75 MLS of Omnipaque 350.     FINDINGS:   LOWER CHEST: Linear scarring in the left base. No pulmonary nodules or effusions    HEPATOBILIARY: Normal.    PANCREAS: Normal.    SPLEEN: Normal.    ADRENAL GLANDS: Normal.    KIDNEYS/BLADDER: Normal.    BOWEL: No ascites or peritoneal tumor nodules. No bowel obstruction.    LYMPH NODES: Clips in the upper abdomen from prior lymph node dissection. Extensive streak artifact. No visible adenopathy.    VASCULATURE: Unremarkable.    PELVIC ORGANS: Atherectomy.    MUSCULOSKELETAL: Heterogeneous, ill-defined sclerosis seen in the along the superior half of L4 posterior to the level of the clips. No vertebral body compression fracture. There are other scattered, heterogeneous sclerotic foci in the pelvis and sacrum   as well (axial images 111, 117, 118, 131, 134 and  151).       Impression    1.  No abnormalities are noted to explain abdominal pain.  2.  There are clips from prior lymph node dissection in the mid abdomen.  3. Heterogeneous sclerosis noted adjacent to the retroperitoneal clips at L4  and scattered throughout the iliac wings and sacrum. These are indeterminant. These may reflect post radiation changes/areas of treated disease. Given her history, suspect   patient has prior studies for comparison. Suggest obtaining them and an addendum can be dictated.    NOTE: ABNORMAL REPORT    THE DICTATION ABOVE DESCRIBES AN ABNORMALITY FOR WHICH FOLLOW-UP IS NEEDED.        Suzi,  CT scan did show some mild abnormalities at the bone area, radiologist is suggesting comparison with previous studies, are you able to get them from previous clinic and imaging center?, I did not see anything in the [facility] portal  Dr. Jones    Please call with questions or contact us using [employer].    Sincerely,        Electronically signed by Tracy Jones MD

## 2021-06-24 NOTE — TELEPHONE ENCOUNTER
Refill Approved    Rx renewed per Medication Renewal Policy. Medication was last renewed on 11/21/17.    Last office visit 4/17/18    Jorge Apple, [facility] Triage/Med Refill 3/7/2019     Requested Prescriptions   Pending Prescriptions Disp Refills     estradiol (ESTRACE) 2 MG tablet [Pharmacy Med Name: Estradiol Oral Tablet 2 MG] 90 tablet 1     Sig: TAKE ONE TABLET BY MOUTH ONE TIME DAILY    Hormone Replacement Therapy Refill Protocol Passed - 3/4/2019 12:02 PM       Passed - PCP or prescribing provider visit in past 12 months      Last office visit with prescriber/PCP: 4/5/2016 Tracy Jones MD OR same dept: Visit date not found OR same specialty: 1/30/2018 Yecenia David MD  Last physical: 4/17/2018 Last MTM visit: Visit date not found     Next visit within 3 mo: Visit date not found  Next physical within 3 mo: Visit date not found  Prescriber OR PCP: Tracy Jones MD  Last diagnosis associated with med order: 1. Menopausal and postmenopausal disorder  - estradiol (ESTRACE) 2 MG tablet [Pharmacy Med Name: Estradiol Oral Tablet 2 MG]; TAKE ONE TABLET BY MOUTH ONE TIME DAILY   Dispense: 90 tablet; Refill: 1     If protocol passes may refill for 3 months.

## 2021-08-18 ENCOUNTER — OFFICE VISIT (OUTPATIENT)
Dept: URGENT CARE | Facility: URGENT CARE | Age: 56
End: 2021-08-18
Payer: COMMERCIAL

## 2021-08-18 VITALS
DIASTOLIC BLOOD PRESSURE: 80 MMHG | OXYGEN SATURATION: 100 % | TEMPERATURE: 98.2 F | WEIGHT: 106 LBS | HEART RATE: 94 BPM | BODY MASS INDEX: 17.37 KG/M2 | SYSTOLIC BLOOD PRESSURE: 134 MMHG

## 2021-08-18 DIAGNOSIS — H61.21 IMPACTED CERUMEN OF RIGHT EAR: Primary | ICD-10-CM

## 2021-08-18 PROCEDURE — 69209 REMOVE IMPACTED EAR WAX UNI: CPT | Mod: RT | Performed by: NURSE PRACTITIONER

## 2021-08-18 PROCEDURE — 99213 OFFICE O/P EST LOW 20 MIN: CPT | Mod: 25 | Performed by: NURSE PRACTITIONER

## 2021-08-18 ASSESSMENT — ENCOUNTER SYMPTOMS
FEVER: 0
SINUS PAIN: 0
SLEEP DISTURBANCE: 0
FATIGUE: 0
ADENOPATHY: 0
HEADACHES: 0
CHILLS: 0
SINUS PRESSURE: 0
DIAPHORESIS: 0
ACTIVITY CHANGE: 0

## 2021-08-18 NOTE — PROGRESS NOTES
Chief Complaint   Patient presents with     Urgent Care     Ear Problem     c/o ear plugged for 1 day     SUBJECTIVE:  Shelby Cruz is a 55 year old female who presents with right ear feeling plugged for 1 day. It seems like there is cotton inside, hard of hearing and numbness. She denies drainage, fever, sinus pain, lymphadenopathy.    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 Involvement of CNS, thyroid, ovary. Underwent LOLITA/BSO in addition to intrathecal and systemic chemotherapy. Subsequent intra-abdominal or paraspinal resection.     Hx antineoplastic chemotherapy      Hx of radiation therapy      Unspecified hypothyroidism      estradiol (ESTRACE) 2 MG tablet, Take 1 tablet (2 mg) by mouth daily  levothyroxine (SYNTHROID) 112 MCG tablet, Take 1 tablet (112 mcg) by mouth daily  cholecalciferol (VITAMIN D3) 1250 mcg (19222 units) capsule, Take 1,250 mcg by mouth every 7 days (Patient not taking: Reported on 8/18/2021)  MULTI-VITAMIN OR TABS, ONE TABLET DAILY (Patient not taking: Reported on 8/18/2021)    No current facility-administered medications on file prior to visit.    Social History     Tobacco Use     Smoking status: Never Smoker     Smokeless tobacco: Never Used   Substance Use Topics     Alcohol use: Yes     Comment: 3 drinks a week     Allergies   Allergen Reactions     No Known Drug Allergies      Review of Systems   Constitutional: Negative for activity change, chills, diaphoresis, fatigue and fever.   HENT: Positive for hearing loss. Negative for congestion, ear discharge, ear pain, sinus pressure, sinus pain and tinnitus.    Allergic/Immunologic: Negative for environmental allergies.   Neurological: Negative for headaches.   Hematological: Negative for adenopathy.   Psychiatric/Behavioral: Negative for sleep disturbance.     OBJECTIVE:  /80   Pulse 94   Temp 98.2  F (36.8   C) (Oral)   Wt 48.1 kg (106 lb)   SpO2 100%   BMI 17.37 kg/m       Physical Exam  Vitals reviewed.   Constitutional:       General: She is not in acute distress.     Appearance: Normal appearance. She is not ill-appearing, toxic-appearing or diaphoretic.   HENT:      Head: Normocephalic and atraumatic.      Left Ear: Tympanic membrane and ear canal normal.      Ears:      Comments: Right ear canal with impacted cerumen. The ear was gently flushed with peroxide diluted with warm water by MA with successful removal of cerumen. TM was then visualized, intact and nonerythematous.       Nose: Nose normal.   Cardiovascular:      Rate and Rhythm: Normal rate.   Pulmonary:      Effort: Pulmonary effort is normal.   Musculoskeletal:      Cervical back: Normal range of motion and neck supple.   Skin:     General: Skin is warm and dry.   Neurological:      General: No focal deficit present.      Mental Status: She is alert and oriented to person, place, and time.   Psychiatric:         Mood and Affect: Mood normal.         Behavior: Behavior normal.       ASSESSMENT:    ICD-10-CM    1. Impacted cerumen of right ear  H61.21 HC REMOVAL IMPACTED CERUMEN IRRIGATION/LVG UNILAT     PLAN:   Patient Instructions   Your ears were irrigated today.  Discouraged use of Q-tips/vance pins. This may only aggravate the problem.   Do not use ear candles or home irrigation as these techniques may cause injury and have not been proven to be effective.    In the future if wax becomes an issue again, may use 1:1 peroxide/water solution, mineral oil or Debrox at home.  -Place 5-10 drops in ear  -Tilt you head to the side and allow solution to sit for 5 minutes  -Tilt ear to the other side to allow solution to drain  -If symptoms do not improve in 3-4 days, follow up in a clinic to have ears flushed    To prevent build up, dip a cotton ball in mineral oil and place in ear for 10-15 min to allow wax to soften.  If wax build up is a recurring  problem for you, come in every 6 months- 1 year to have your ears cleaned.    Follow up with primary care provider with any problems, questions or concerns or if symptoms worsen or fail to improve. Patient agreed to plan and verbalized understanding.    FREDRICK [facility]

## 2021-08-18 NOTE — PATIENT INSTRUCTIONS
Your ears were irrigated today.  Discouraged use of Q-tips/vance pins. This may only aggravate the problem.   Do not use ear candles or home irrigation as these techniques may cause injury and have not been proven to be effective.    In the future if wax becomes an issue again, may use 1:1 peroxide/water solution, mineral oil or Debrox at home.  -Place 5-10 drops in ear  -Tilt you head to the side and allow solution to sit for 5 minutes  -Tilt ear to the other side to allow solution to drain  -If symptoms do not improve in 3-4 days, follow up in a clinic to have ears flushed    To prevent build up, dip a cotton ball in mineral oil and place in ear for 10-15 min to allow wax to soften.  If wax build up is a recurring problem for you, come in every 6 months- 1 year to have your ears cleaned.

## 2021-08-30 ENCOUNTER — TELEPHONE (OUTPATIENT)
Dept: FAMILY MEDICINE | Facility: CLINIC | Age: 56
End: 2021-08-30

## 2021-08-30 DIAGNOSIS — D22.9 SKIN MOLE: Primary | ICD-10-CM

## 2021-08-30 NOTE — TELEPHONE ENCOUNTER
Reason for Call: Request for an order or referral:    Order or referral being requested: dermatology     Date needed: asap      Has the patient been seen by the PCP for this problem? YES    Additional comments:mole on upper back has changed darker and larger    [facility] like to go to [facility]   Phone number Patient can be reached at:  Cell number on file:    Telephone Information:   Mobile [phone]       Best Time:  anytime    Can we leave a detailed message on this number?  YES    Call taken on 8/30/2021 at 3:39 PM by Isabella Rey

## 2021-08-30 NOTE — TELEPHONE ENCOUNTER
Pt is wanting Referral to [facility] for Mole on upper back that has changed colors and getting larger.

## 2021-09-14 ENCOUNTER — TRANSFERRED RECORDS (OUTPATIENT)
Dept: HEALTH INFORMATION MANAGEMENT | Facility: CLINIC | Age: 56
End: 2021-09-14

## 2021-09-15 ENCOUNTER — OFFICE VISIT (OUTPATIENT)
Dept: VASCULAR SURGERY | Facility: CLINIC | Age: 56
End: 2021-09-15
Payer: COMMERCIAL

## 2021-09-15 DIAGNOSIS — I83.811 VARICOSE VEINS OF RIGHT LOWER EXTREMITY WITH PAIN: Primary | ICD-10-CM

## 2021-09-15 PROCEDURE — 99213 OFFICE O/P EST LOW 20 MIN: CPT | Performed by: SURGERY

## 2021-09-15 NOTE — PROGRESS NOTES
Mrs. Franco returns to clinic today.  She is a very pleasant 55-year-old female who was previously seen by my partner, Dr. Domingo, in June of this year.  At that time patient had presented with pain in the right lower extremity.  Pain is centered over prominent varicose veins which are located in the upper inner thigh and the lateral aspect of the outer lower thigh and calf area.  Patient experiences symptoms of pain, heaviness, burning and pruritus.  She is quite active in terms of physical activity and it significantly affects her day-to-day living as well as ability to perform daily activities.  She had started wearing compression stockings and has been wearing them religiously over the past 3 to 4 months.  There is limited relief with the symptoms.    She underwent sonography which shows that the right deep veins are competent.  Right great saphenous vein is incompetent from the saphenofemoral junction to the mid thigh and at the level of the knee.  The varicose veins are arising from the great saphenous vein.    Diagnosis: 1.  Lifestyle limiting right lower extremity varicose veins with failure of conservative management.  2.  Right great saphenous vein incompetence.  3.  Asymptomatic bilateral lower extremity spider veins.    Plan: I explained the pathophysiology of disease to her in detail.  She has failed conservative management and has a clearly identifiable and correctable cause for the symptomatic varicose veins of the right lower extremity.  I would recommend right great saphenous vein radiofrequency ablation and phlebectomies.    I have advised her to continue to wear the compression stockings and we will move on with scheduling of the procedure under care of Dr. Domingo.

## 2021-10-20 DIAGNOSIS — N95.9 MENOPAUSAL AND POSTMENOPAUSAL DISORDER: ICD-10-CM

## 2021-10-21 RX ORDER — ESTRADIOL 2 MG/1
TABLET ORAL
Qty: 90 TABLET | Refills: 0 | Status: SHIPPED | OUTPATIENT
Start: 2021-10-21 | End: 2022-01-20

## 2021-10-21 NOTE — TELEPHONE ENCOUNTER
"  Disp Refills Start End LUIS ALBERTO    estradioL (ESTRACE) 2 MG tablet 90 tablet 0 1/22/2021  No   Sig: TAKE 1 TABLET BY MOUTH DAILY   Sent to pharmacy as: estradioL 2 mg tablet (ESTRACE)   E-Prescribing Status: Receipt confirmed by pharmacy (1/22/2021  2:19 PM CST)       estradioL (ESTRACE) 2 MG tablet [[id number]]    Electronically signed by: Lianne Bradford RN on 01/22/21 1419 Status: Active   Ordering user: Lianne Bradford RN 01/22/21 1419 Ordering provider: Tracy Jones MD   Authorized by: Tracy Jones MD   Frequency:  01/22/21 - Until Discontinued Released by: Lianne Bradford RN 01/22/21 1419   Diagnoses  Menopausal and postmenopausal disorder [N95.9]     Routing refill request to provider for review/approval because:  A break in medication    Last Written Prescription Date:  1/22/21  Last Fill Quantity: 90,  # refills: 0   Last office visit provider:  1/20/21     Requested Prescriptions   Pending Prescriptions Disp Refills     estradiol (ESTRACE) 2 MG tablet [Pharmacy Med Name: ESTRADIOL 2MG TABLETS] 90 tablet 0     Sig: TAKE 1 TABLET BY MOUTH DAILY       Hormone Replacement Therapy Passed - 10/20/2021 11:06 AM        Passed - Blood pressure under 140/90 in past 12 months     BP Readings from Last 3 Encounters:   08/18/21 134/80   01/24/21 135/86   01/24/21 (!) 164/98                 Passed - Recent (12 mo) or future (30 days) visit within the authorizing provider's specialty     Patient has had an office visit with the authorizing provider or a provider within the authorizing providers department within the previous 12 mos or has a future within next 30 days. See \"Patient Info\" tab in inbasket, or \"Choose Columns\" in Meds & Orders section of the refill encounter.              Passed - Patient has mammogram in past 2 years on file if age 50-75        Passed - Medication is active on med list        Passed - Patient is 18 years of age or older        Passed - No active pregnancy on record    "     Passed - No positive pregnancy test on record in past 12 months             Yonathan Smalls RN 10/21/21 9:44 AM

## 2021-12-02 ENCOUNTER — TELEPHONE (OUTPATIENT)
Dept: VASCULAR SURGERY | Facility: CLINIC | Age: 56
End: 2021-12-02
Payer: COMMERCIAL

## 2021-12-02 DIAGNOSIS — I83.811 VARICOSE VEINS OF RIGHT LOWER EXTREMITY WITH PAIN: Primary | ICD-10-CM

## 2021-12-02 NOTE — TELEPHONE ENCOUNTER
Vein Clinic Preoperative Nurse Call    Procedure: *Pt needs to call back to confirm proc* (originally appt notes & order form said right leg phlebs (med nec) and sclero ($), but per K[facility] - plan is right GSV ablation and phlebs (med nec) and sclero ($) at her 6 week postop.)  Date: Wednesday 12/8  Surgeon: Dr. Muniz  Time: 0845  Check in time: 0745    Called patient and left detailed message. Informed patient: when to check in (0745) to sign consent, to bring their preop medications in their original bottle with them (3mg ativan, 0.1mg clonidine). Patient will take the medications after signing the consent to the procedure. Instructed patient to wear a mask, wear loose-fitting comfortable clothing, and bring their compression hose. Ensured patient has a /someone that will be responsible for them the rest of the day. Visitors are allowed in the clinic, but they would need to stay in an exam room or wait in the parking lot or leave. If  does leave, IF POSSIBLE, we ask that they do not go more than 15-20 mins from our clinic. Once procedure is completed, we will keep patient in recovery for 30-45 mins, and call  with aftercare instructions. Informed patient, that if possible, they should sit in the backseat to elevate their leg on the ride home.    Pt needs thigh-high compression hose for procedure. Status of the hose: patient has thigh high hose.    Special COVID-19 instructions: Patient aware they need to wear a mask to our clinic and during their procedure. Patient aware that their  can come in with them, but would need to stay in an exam room during the procedure or wait in the parking lot or leave. Nurse will call pt's  when procedure is over.    Patient understands that if they have any of the following symptoms (fever, cough, shortness of breath, rash), they need to notify us immediately to cancel their procedure and will have to reschedule for a later date.    Gave patient our  call back number to call us back to confirm procedure and review all preop information.    Rosalva Narayan, RN  12/2/2021

## 2021-12-03 NOTE — TELEPHONE ENCOUNTER
Pt called back. Spoke with pt for about 20 minutes regarding her upcoming vein procedure with Dr. Muniz on Wed. 12/8, as pt was a little confused on the plan. Explained to pt the order form that was given to Malathi our surgery scheduler was different than what Dr. Muniz dictated in his office visit note. Informed pt the CURRENT PLAN is to do Right leg VNUS closure of GSV (med nec) with 1 unit phlebs (med nec) and to do the bilateral leg cosmetic sclerotherapy at her 6 week post op appt. Pt is unsure about having any veins removed at this time (phlebs) and so she may just like to proceed by doing the Right leg GSV ablation and sclero ($) next week, on the same day, if possible.     Informed pt Dr. Muniz will be notified and confirm this plan is okay and will call patient back, hopefully today if possible.    Pt in agreement with plan.    Rosalva Narayan RN  [facility]  Vein Clinic

## 2021-12-03 NOTE — TELEPHONE ENCOUNTER
OK per Dr. Muniz to do Sclero($) first then Right leg GSV ablation on the same day next Wednesday 12/8.    Called pt back and reviewed all preop and postop information with her.    Pt in agreement with plan and had no further questions at this time.    Rosalva Narayan RN  [facility]  Vein Clinic

## 2021-12-04 RX ORDER — CLONIDINE HYDROCHLORIDE 0.1 MG/1
TABLET ORAL
Qty: 1 TABLET | Refills: 0 | Status: SHIPPED | OUTPATIENT
Start: 2021-12-04 | End: 2021-12-10

## 2021-12-04 RX ORDER — LORAZEPAM 1 MG/1
TABLET ORAL
Qty: 3 TABLET | Refills: 0 | Status: SHIPPED | OUTPATIENT
Start: 2021-12-04 | End: 2021-12-10

## 2021-12-07 NOTE — PATIENT INSTRUCTIONS
Post-Procedure Instructions:                          VNUS Closure    Post-Op Day Zero - The Day of Your Procedure:  1. Medication for Pain Control and Inflammation Control   - The numbing medication injected during your procedure will last for several hours. The pre-procedure    tablets may make you very sleepy and you might not remember everything from the procedure or from    the day. This will usually wear off by the next day.   - Ibuprofen:  If tolerated, take ibuprofen (e.g., Advil) to reduce inflammation whether or not you have    pain. For three days, take two tablets (200mg each) with every meal and at bedtime with a snack. If    you are not able to take Ibuprofen, Tylenol is another option.   - You may resume taking any medications you were taking before your procedure.  2. Activity   You may be up as tolerated, when the sedation wears off. Elevate if possible when not walking.  3. Bandages   - You will have one or more small bandages covering the incision site(s) where we accessed the vein(s).    Keep your bandages on and dry for 48 hours. Compression hose should be worn continuously over    the bandages for the first 24 hours.  4. Incisions   - Bleeding: You may see some incision sites that are oozing through the bandages. This is not unusual    and can be managed with Rest, Ice, Compression and Elevation (RICE). Apply ice and firm pressure    directly to the site that is bleeding and rest with your leg(s) elevated above your heart for 20-30 minutes.    Post-Op Day One:  1. Medication   - Ibuprofen:  Continue the same as the Day of Your Procedure.  2. Activity   - Walk as tolerated. Resume your normal daily walking activities. If it hurts, stop. We encourage you to               walk. Elevate if possible when not walking.  3. Bandages and Compression   - After 24 hours, you may remove your compression hose to take a shower. Please keep your bandage(s)    on and intact. You may want to cover your  bandage(s) to ensure it remains dry during your shower.    Reapply your compression hose after your shower and wear during waking hours only.  4. Driving   - You may resume driving when you can do so safely.  Post-Op Day Two:   1. Medication  - Ibuprofen:  Continue the same as the Day of Your Procedure.  2.  Activity   - Walk as tolerated. Elevate if possible when not walking.  3. Bandages and Compression  - You may remove your bandage after 48 hours. Continue wearing your compression hose during waking hours only for a total of seven days following your procedure.  4. Incisions   - Your leg(s) may be bruised at and around the incision site(s). This is normal.  5. Call Us If:   - You see any areas on your leg that are red and angry in appearance.   - You notice any drainage that is milky or cloudy in appearance or that has a foul odor.   - You run a temperature of 100.5 or greater.    Post-Op Day Three:  Your follow up appointment is: ______________________________________________    At this appointment, you will have an ultrasound and we will check your incisions. If your doctor is not scheduled to be in the clinic at the time of your appointment, you will be seen by a different doctor or nurse.  __________________________________________________________________________________________    The Two Weeks Following Your Procedure  1.  Skin Care   - Do not use any lotions, creams or powders on your leg for 14 days or until the incisions have healed.   - Do not soak in a bathtub, whirlpool, or hot tub or go swimming for 14 days or until your incisions have  healed.  2.  Medications   - You may use ibuprofen or acetaminophen (e.g., Tylenol) as needed for pain or discomfort.  3.  Activity   - Do not lift over 25 pounds. After about two weeks you may resume exercise such as aerobics, running,    tennis or weight lifting. Use your common sense and ease back into your exercise routine slowly.   - You may feel a cord-like  tightness along the inside of your leg. Gentle stretching can be helpful.  4. Compression Hose   - Your doctor may instruct you to wear compression for longer than seven days; please    follow your doctor's instructions. As a comfort measure, you may choose to wear compression for    longer than required.  5.  Travel   - Do not fly in an airplane for 14 days after your procedure.  If you have a long car trip planned within    two to three weeks following your procedure, stop and walk for a few minutes every two hours.    Periodic ankle pumps during the ride may be helpful.    Six Week Appointment   At your six week appointment, you will see your surgeon for an exam and evaluation. This office visit    will be scheduled when you return for Post-op Day Three Return Appointment.     Return to Work  1.  If you work outside the home, you may return to work in a few days depending on the extent of your    procedure, how you tolerate it, and the type of work you perform.  2.  Paperwork: If your employer requires paperwork or you would like a letter written to your employer, please    let us know. We will complete disability type forms at no charge. Please allow five business days for    forms to be completed.         SCLEROTHERAPY AFTER CARE  Immediately:  After treatment, walk for 10-15 minutes before getting in your car.  If your trip home is more than 1 hour, stop and walk around for 5-10 minutes. Avoid sitting or standing for extended periods.   First 24 hours: Wear your compression continuously, even while in bed. After the 24 hours, you may shower if you want to. Put your hose back on, unless you are going to bed. You should NOT wear compression to bed after the first 24 hours. You may fly the next day, but wear your compression.   For 5 days: Wear the compression hose for waking hours only. You may continue to wear them longer than 5 days if you prefer.   For days 5-7: Walking is encouraged, as it promotes efficient  circulation in your veins. You may do activities that raise your heart rate, but do NOT run, jog, do high impact aerobics, or weight lifting. After 7 days, no activity restrictions.    Shaving: Wait a few days to shave or apply lotion.   Bathing: Do NOT take hot baths or sit in a hot tub for 7-10 days.    For 1 year: Wear SPF 30 sunscreen on your legs when in the sun. This is very important! It helps prevent darkening of the skin at the injection sites.   Medications: You may resume your usual medications, including aspirin or ibuprofen.    Common Things to Expect  -  Compression must be worn for the first 24 hours and then during the day for 5-7 days.    -  If larger veins are treated with ultrasound-guided sclerotherapy, you will have redness, firmness, tenderness, and swelling.  This firmness and tenderness may take 3-6 months to resolve. Ibuprofen and compression hose will aid in this process.    -  You will have bruising that can last up to 3 weeks. Most fading of the veins will occur between 3 and 6 weeks after treatment.    -  You may notice brown discoloration (hyperpigmentation) at the treatment site.  This should fade with time, but will take 3 months to 1 year to fully heal.     -  Some treated veins may look darker because of trapped blood within the vein. This trapped blood can be removed at a minimum of 1 month following treatment. Larger veins are more likely to develop trapped blood.    -  It is very important for you to use at least SPF 30 sunscreen in order to help prevent the discoloration of your skin.    -  Migraines rarely occur following sclerotherapy, but are more likely in patients with a history of migraines.  Treat as you would any other migraine.

## 2021-12-08 ENCOUNTER — OFFICE VISIT (OUTPATIENT)
Dept: VASCULAR SURGERY | Facility: CLINIC | Age: 56
End: 2021-12-08
Payer: COMMERCIAL

## 2021-12-08 VITALS — HEART RATE: 79 BPM | DIASTOLIC BLOOD PRESSURE: 76 MMHG | SYSTOLIC BLOOD PRESSURE: 124 MMHG | OXYGEN SATURATION: 100 %

## 2021-12-08 DIAGNOSIS — I83.93 SPIDER VEINS OF BOTH LOWER EXTREMITIES: Primary | ICD-10-CM

## 2021-12-08 PROCEDURE — S9999 SALES TAX: HCPCS | Performed by: SURGERY

## 2021-12-08 PROCEDURE — 36475 ENDOVENOUS RF 1ST VEIN: CPT | Mod: RT | Performed by: SURGERY

## 2021-12-08 PROCEDURE — 36468 NJX SCLRSNT SPIDER VEINS: CPT | Performed by: SURGERY

## 2021-12-08 NOTE — LETTER
12/8/2021         RE: Shelby Cruz  [address] [facility] [id number]        Dear Colleague,    Thank you for referring your patient, Shelby Cruz, to the [facility]. Please see a copy of my visit note below.    Pre-procedure Nursing Note    Shelby Cruz presents to clinic for Vein Procedure  .   /Person Responsible for Patient: Eleazar (spouse)  Phone Number: [phone]    Prophylactic Medication:N/A   Sedation Medication: Ativan, 3mg ,   Time Taken: 0810 and Clonidine, 0.1mg,   Time Taken: 0810  Compression Stockings: Patient brought  The procedure is being performed on BLE for sclerotherapy, RLE for ablation  Patient understanding of procedure matches consent? YES    Patient's pre-procedure medications verified by VALERIA Blackwell.    Rosalva Narayan RN on 12/8/2021 at 8:13 AM        Vein Clinic Procedure Note    Preoperative diagnosis:    1.  Symptomatic right lower extremity varicose veins.  2.  Failure of conservative management.  3.  Asymptomatic bilateral lower extremity spider veins.    Post operative diagnosis:  Same    Procedure:  1.  Right great saphenous vein radiofrequency ablation.  2.  Bilateral lower extremity spider vein sclerotherapy.    Preoperative medications: 3 mg ativan, 0.1 mg clonidine      Sclerotherapy    Date/Time: 12/8/2021 11:38 AM  Performed by: Familia Muniz MD  Authorized by: Familia Muniz MD     Type:  Cosmetic  Session:  Full  Procedure side:  Bilateral  Solution/Amount:  .5 POLIDOCANOL  Syringes::  6        Operative description  Indications: This is a 56-year-old female with right lower extremity symptomatic varicose veins which have failed conservative management.  Radiofrequency ablation of great saphenous vein is advised.  She also has multiple bilateral lower extremity spider veins which will be addressed on a cosmetic basis.    Procedure: After I was done with the sclerotherapy patient was  brought to the procedure room and placed in supine position.  Right lower extremity was prepped and a sterile surgical field was created.  Preprocedure timeout was conducted.  The right great saphenous vein was mapped out from the knee to the saphenofemoral junction.  Local anesthetic was administered in the soft tissue around the level of the knee.  The right great saphenous vein was accessed at the level of the knee with a micropuncture needle followed by placement of a microwire.  A short 7 Urdu sheath was placed.  Radiofrequency ablation catheter was advanced to the saphenofemoral junction and pulled back 2.3 cm.  Generous tumescent anesthetic was administered around the saphenous compartment.  Radiofrequency ablation was performed in standard fashion.    VNUS: Right great saphenous vein.    Stab phlebectomy: None.    EBL: 2 mL    Flowsheet Data 12/8/2021   Procedure Start Time:  9:18 AM   Prep: Chloraprep   Side: Right   Tx Length (cm): RIGHT GSV: 30   Junction (cm): RIGHT GSV: 2.60   RF Cycles: RIGHT GSV: 7   RF TX Time (Minutes): RIGHT GSV: 2:20   Sedation taken: Yes   Pre Pt. Physical / Cognitive Limitations: WNL   TOTAL Local anesthesia Injected (ml): 5   Max Volume Local Anesthesia (ml): 11   TOTAL Tumescent Injected volume (ml): 250   Max Volume Tumescent (ml): 572   Post Pt. Physical / Cognitive Limitations: WNL   Procedure End Time:  9:45 AM   D/C Instructions given, states readiness to leave and escorted to car: Yes       Patient's blood pressure, pulse and pulse oximetry were continuously monitored throughout the procedure under my direct supervision and was stable during the procedure.    Patient recovered in our suites and discharged home with their family with postoperative instructions and follow up.     Familia Muniz MD      Again, thank you for allowing me to participate in the care of your patient.        Sincerely,        Familia Muniz MD

## 2021-12-08 NOTE — PROGRESS NOTES
Vein Clinic Procedure Note    Preoperative diagnosis:    1.  Symptomatic right lower extremity varicose veins.  2.  Failure of conservative management.  3.  Asymptomatic bilateral lower extremity spider veins.    Post operative diagnosis:  Same    Procedure:  1.  Right great saphenous vein radiofrequency ablation.  2.  Bilateral lower extremity spider vein sclerotherapy.    Preoperative medications: 3 mg ativan, 0.1 mg clonidine      Sclerotherapy    Date/Time: 12/8/2021 11:38 AM  Performed by: Familia Muniz MD  Authorized by: Familia Muniz MD     Type:  Cosmetic  Session:  Full  Procedure side:  Bilateral  Solution/Amount:  .5 POLIDOCANOL  Syringes::  6        Operative description  Indications: This is a 56-year-old female with right lower extremity symptomatic varicose veins which have failed conservative management.  Radiofrequency ablation of great saphenous vein is advised.  She also has multiple bilateral lower extremity spider veins which will be addressed on a cosmetic basis.    Procedure: After I was done with the sclerotherapy patient was brought to the procedure room and placed in supine position.  Right lower extremity was prepped and a sterile surgical field was created.  Preprocedure timeout was conducted.  The right great saphenous vein was mapped out from the knee to the saphenofemoral junction.  Local anesthetic was administered in the soft tissue around the level of the knee.  The right great saphenous vein was accessed at the level of the knee with a micropuncture needle followed by placement of a microwire.  A short 7 Ukrainian sheath was placed.  Radiofrequency ablation catheter was advanced to the saphenofemoral junction and pulled back 2.3 cm.  Generous tumescent anesthetic was administered around the saphenous compartment.  Radiofrequency ablation was performed in standard fashion.    VNUS: Right great saphenous vein.    Stab phlebectomy: None.    EBL: 2 mL    Flowsheet  Data 12/8/2021   Procedure Start Time:  9:18 AM   Prep: Chloraprep   Side: Right   Tx Length (cm): RIGHT GSV: 30   Junction (cm): RIGHT GSV: 2.60   RF Cycles: RIGHT GSV: 7   RF TX Time (Minutes): RIGHT GSV: 2:20   Sedation taken: Yes   Pre Pt. Physical / Cognitive Limitations: WNL   TOTAL Local anesthesia Injected (ml): 5   Max Volume Local Anesthesia (ml): 11   TOTAL Tumescent Injected volume (ml): 250   Max Volume Tumescent (ml): 572   Post Pt. Physical / Cognitive Limitations: WNL   Procedure End Time:  9:45 AM   D/C Instructions given, states readiness to leave and escorted to car: Yes       Patient's blood pressure, pulse and pulse oximetry were continuously monitored throughout the procedure under my direct supervision and was stable during the procedure.    Patient recovered in our suites and discharged home with their family with postoperative instructions and follow up.     Familia Muniz MD

## 2021-12-08 NOTE — PROGRESS NOTES
Pre-procedure Nursing Note    Shelby Cruz presents to clinic for Vein Procedure  .   /Person Responsible for Patient: Eleazar (spouse)  Phone Number: [phone]    Prophylactic Medication:N/A   Sedation Medication: Ativan, 3mg ,   Time Taken: 0810 and Clonidine, 0.1mg,   Time Taken: 0810  Compression Stockings: Patient brought  The procedure is being performed on BLE for sclerotherapy, RLE for ablation  Patient understanding of procedure matches consent? YES    Patient's pre-procedure medications verified by VALERIA Blackwell.    Rosalva Narayan RN on 12/8/2021 at 8:13 AM

## 2021-12-10 ENCOUNTER — OFFICE VISIT (OUTPATIENT)
Dept: VASCULAR SURGERY | Facility: CLINIC | Age: 56
End: 2021-12-10
Payer: COMMERCIAL

## 2021-12-10 ENCOUNTER — ANCILLARY PROCEDURE (OUTPATIENT)
Dept: ULTRASOUND IMAGING | Facility: CLINIC | Age: 56
End: 2021-12-10
Attending: SURGERY
Payer: COMMERCIAL

## 2021-12-10 DIAGNOSIS — I83.811 VARICOSE VEINS OF RIGHT LOWER EXTREMITY WITH PAIN: ICD-10-CM

## 2021-12-10 DIAGNOSIS — Z09 POSTOP CHECK: Primary | ICD-10-CM

## 2021-12-10 PROCEDURE — 93971 EXTREMITY STUDY: CPT | Mod: RT | Performed by: SURGERY

## 2021-12-10 PROCEDURE — 99207 PR NO CHARGE NURSE ONLY: CPT

## 2021-12-10 NOTE — PATIENT INSTRUCTIONS
Vein Closure (Ablation)  Common Things to Expect    - A small lump may develop at the site(s) where the vein closure device was inserted. This is a normal step in healing. These should not be painful, but may be tender to the touch. It can take 6 weeks to 3 months for the lumps/firmness to resolve.   - Bruising will look worse before it looks better and can last for 4-6 weeks.  - After about 10 days, you may notice tightness/pulling on the inside thigh and knee. As your ablated vein is healing, it contracts, causing a tightness or pulling sensation. This may last for several weeks, but will resolve. Treat it with Ibuprofen or Advil.  - Numbness will get better with time, but may take 3 months to a year to resolve.  - You may notice that the skin on your legs has become ultra-sensitive to touch. For example, the weight of your sheets may feel painful. This usually resolves in 6 weeks.  - Ankle swelling is not uncommon and may last 4-6 weeks.   - To get optimal results from your procedure, wearing your compression hose is key for the first 7 days. This is necessary to ensure proper closure of the ablated vein.    - For 2 weeks, no weight lifting over 25lbs, no running, and no vigorous aerobic exercise. After this time, ease back into your normal activities. If you do too much too soon, you will have more pain and bruising and possibly re-open the vein that was closed. It takes about 2 weeks for the ablated vein to permanently close. Keep in mind your body is still healing.  - For 2 weeks, do not shave your legs or use lotions, powders, creams to allow proper healing of vein access sites.      If you are experiencing any of the following symptoms, please seek immediate medical attention at your local emergency department.  - Significant pain in the back of the calf possibly with difficulty walking  - Significant swelling and/or tenderness in the back of the calf  - Redness that continues to spread  - Chest pain and/or  shortness of breath    SCLEROTHERAPY AFTER CARE  Immediately:  After treatment, walk for 10-15 minutes before getting in your car.  If your trip home is more than 1 hour, stop and walk around for 5-10 minutes. Avoid sitting or standing for extended periods.   First 24 hours: Wear your compression continuously, even while in bed. After the 24 hours, you may shower if you want to. Put your hose back on, unless you are going to bed. You should NOT wear compression to bed after the first 24 hours. You may fly the next day, but wear your compression.   For 5 days: Wear the compression hose for waking hours only. You may continue to wear them longer than 5 days if you prefer.   For days 5-7: Walking is encouraged, as it promotes efficient circulation in your veins. You may do activities that raise your heart rate, but do NOT run, jog, do high impact aerobics, or weight lifting. After 7 days, no activity restrictions.    Shaving: Wait a few days to shave or apply lotion.   Bathing: Do NOT take hot baths or sit in a hot tub for 7-10 days.    For 1 year: Wear SPF 30 sunscreen on your legs when in the sun. This is very important! It helps prevent darkening of the skin at the injection sites.   Medications: You may resume your usual medications, including aspirin or ibuprofen.    Common Things to Expect  -  Compression must be worn for the first 24 hours and then during the day for 5-7 days.    -  If larger veins are treated with ultrasound-guided sclerotherapy, you will have redness, firmness, tenderness, and swelling.  This firmness and tenderness may take 3-6 months to resolve. Ibuprofen and compression hose will aid in this process.    -  You will have bruising that can last up to 3 weeks. Most fading of the veins will occur between 3 and 6 weeks after treatment.    -  You may notice brown discoloration (hyperpigmentation) at the treatment site.  This should fade with time, but will take 3 months to 1 year to fully heal.      -  Some treated veins may look darker because of trapped blood within the vein. This trapped blood can be removed at a minimum of 1 month following treatment. Larger veins are more likely to develop trapped blood.    -  It is very important for you to use at least SPF 30 sunscreen in order to help prevent the discoloration of your skin.    -  Migraines rarely occur following sclerotherapy, but are more likely in patients with a history of migraines.  Treat as you would any other migraine.

## 2021-12-10 NOTE — PROGRESS NOTES
Vein Clinic Postoperative Nurse Note    Patient is here for their 48 hour postoperative visit.    Procedure: Right leg VNUS closure GSV(med nec), sclero ($)   Procedure Date: 12/8/21  Surgeon: Dr. Muniz    Ultrasound Result: The right GSV is closed 9.4mm from the SFJ to the distal thigh with evidence of thrombus throughout. No evidence of RLE DVT.    Physical Exam: Incisions are approximated without signs of infection.  Ecchymosis: minimal. Pt bruises easily, noticeable sclerotherapy injection sites  Swelling: minimal  Paresthesia: pt denies numbness    Patient Questions or Concerns: Pt stated she slept pretty much until 8am the morning after her procedure day and also vomited a few times from the sedation medications. Pt stated she still feels a little out of it. Encouraged pt to drink lots of fluids and she should be feeling better soon.    Pt's current thigh high hose ([employer] brand) are a little short on her leg, so encouraged pt to get a pair of thigh high [employer] Comfort brand hose. Sent pt to the [employer] store as pt's insurance is billable according to Tegan at the [employer] store.    Reviewed postoperative instructions with patient and provided them with written material of common things to expect from their procedure.     Patient's Next Vein Clinic Appointment: 6 week postop and possibly more sclero injections ($) with Dr. Muniz (1/20/22).    Rosalva Narayan RN

## 2022-01-18 DIAGNOSIS — N95.9 MENOPAUSAL AND POSTMENOPAUSAL DISORDER: ICD-10-CM

## 2022-01-20 ENCOUNTER — OFFICE VISIT (OUTPATIENT)
Dept: VASCULAR SURGERY | Facility: CLINIC | Age: 57
End: 2022-01-20
Payer: COMMERCIAL

## 2022-01-20 DIAGNOSIS — I83.811 VARICOSE VEINS OF RIGHT LOWER EXTREMITY WITH PAIN: Primary | ICD-10-CM

## 2022-01-20 PROCEDURE — 99207 PR VEINSOLUTIONS POST OPERATIVE VISIT: CPT | Performed by: SURGERY

## 2022-01-20 RX ORDER — ESTRADIOL 2 MG/1
TABLET ORAL
Qty: 90 TABLET | Refills: 0 | Status: SHIPPED | OUTPATIENT
Start: 2022-01-20 | End: 2022-07-12

## 2022-01-20 NOTE — TELEPHONE ENCOUNTER
"Routing refill request to provider for review/approval because:  Patient needs to be seen because it has been more than 1 year since last office visit.    Last Written Prescription Date:  10/21/21  Last Fill Quantity: 90,  # refills: 0   Last office visit provider:  1/20/21     Requested Prescriptions   Pending Prescriptions Disp Refills     estradiol (ESTRACE) 2 MG tablet [Pharmacy Med Name: ESTRADIOL 2MG TABLETS] 90 tablet 0     Sig: TAKE 1 TABLET BY MOUTH DAILY       Hormone Replacement Therapy Passed - 1/18/2022  3:42 AM        Passed - Blood pressure under 140/90 in past 12 months     BP Readings from Last 3 Encounters:   12/08/21 124/76   08/18/21 134/80   01/24/21 135/86                 Passed - Recent (12 mo) or future (30 days) visit within the authorizing provider's specialty     Patient has had an office visit with the authorizing provider or a provider within the authorizing providers department within the previous 12 mos or has a future within next 30 days. See \"Patient Info\" tab in inbasket, or \"Choose Columns\" in Meds & Orders section of the refill encounter.              Passed - Patient has mammogram in past 2 years on file if age 50-75        Passed - Medication is active on med list        Passed - Patient is 18 years of age or older        Passed - No active pregnancy on record        Passed - No positive pregnancy test on record in past 12 months             Joy Klein RN 01/20/22 7:55 AM  "

## 2022-01-20 NOTE — PROGRESS NOTES
Mrs. WHEELER is an exceedingly pleasant 56-year-old female who we have treated for symptomatic right lower extremity varicose veins.  She underwent a right great saphenous vein ablation on 8 December 2021.    She tells me that the symptoms have significantly improved.  The areas of the spider veins that I had injected have thrombosed but some are still patent.    I have advised that we wait 6 more weeks and reevaluate the areas of spider veins and then inject whatever spider veins are visible at that time.  I look forward to seeing her back in 6 weeks.

## 2022-01-20 NOTE — LETTER
1/20/2022         RE: Shelby Cruz  [address] [facility] [address] [address]        Dear Colleague,    Thank you for referring your patient, Shelby Cruz, to the [facility] [place]. Please see a copy of my visit note below.    Mrs. WHEELER is an exceedingly pleasant 56-year-old female who we have treated for symptomatic right lower extremity varicose veins.  She underwent a right great saphenous vein ablation on 8 December 2021.    She tells me that the symptoms have significantly improved.  The areas of the spider veins that I had injected have thrombosed but some are still patent.    I have advised that we wait 6 more weeks and reevaluate the areas of spider veins and then inject whatever spider veins are visible at that time.  I look forward to seeing her back in 6 weeks.      Again, thank you for allowing me to participate in the care of your patient.        Sincerely,        Familia Muniz MD

## 2022-02-27 ENCOUNTER — HEALTH MAINTENANCE LETTER (OUTPATIENT)
Age: 57
End: 2022-02-27

## 2022-03-03 ENCOUNTER — OFFICE VISIT (OUTPATIENT)
Dept: VASCULAR SURGERY | Facility: CLINIC | Age: 57
End: 2022-03-03

## 2022-03-03 DIAGNOSIS — I83.93 SPIDER VEINS OF BOTH LOWER EXTREMITIES: Primary | ICD-10-CM

## 2022-03-03 PROCEDURE — 99207 PR VEINSOLUTIONS POST OPERATIVE VISIT: CPT | Performed by: SURGERY

## 2022-03-03 NOTE — LETTER
3/3/2022         RE: Shelby Cruz  [address] [address]        Dear Colleague,    Thank you for referring your patient, Shelby Cruz, to the [facility] [place]. Please see a copy of my visit note below.    Mrs. Franco has previously been treated with bilateral lower extremity sclerotherapy for spider veins.  She is quite satisfied with the outcome but there is 1 very small area of spider veins in the left thigh that still catches her attention.    I administered 1 mL of 0.5% polidocanol in that area after informed consent was obtained and aseptic technique was employed.  This was free of cost.      Again, thank you for allowing me to participate in the care of your patient.        Sincerely,        Familia Muniz MD

## 2022-03-04 NOTE — PROGRESS NOTES
Mrs. Franco has previously been treated with bilateral lower extremity sclerotherapy for spider veins.  She is quite satisfied with the outcome but there is 1 very small area of spider veins in the left thigh that still catches her attention.    I administered 1 mL of 0.5% polidocanol in that area after informed consent was obtained and aseptic technique was employed.  This was free of cost.

## 2022-03-23 ENCOUNTER — ANCILLARY PROCEDURE (OUTPATIENT)
Dept: MAMMOGRAPHY | Facility: CLINIC | Age: 57
End: 2022-03-23
Attending: FAMILY MEDICINE
Payer: COMMERCIAL

## 2022-03-23 DIAGNOSIS — Z12.31 VISIT FOR SCREENING MAMMOGRAM: ICD-10-CM

## 2022-03-23 PROCEDURE — 77063 BREAST TOMOSYNTHESIS BI: CPT | Mod: TC | Performed by: RADIOLOGY

## 2022-03-23 PROCEDURE — 77067 SCR MAMMO BI INCL CAD: CPT | Mod: TC | Performed by: RADIOLOGY

## 2022-03-29 ENCOUNTER — TRANSFERRED RECORDS (OUTPATIENT)
Dept: HEALTH INFORMATION MANAGEMENT | Facility: CLINIC | Age: 57
End: 2022-03-29
Payer: COMMERCIAL

## 2022-09-08 ENCOUNTER — OFFICE VISIT (OUTPATIENT)
Dept: FAMILY MEDICINE | Facility: CLINIC | Age: 57
End: 2022-09-08
Payer: COMMERCIAL

## 2022-09-08 VITALS
DIASTOLIC BLOOD PRESSURE: 82 MMHG | BODY MASS INDEX: 17.16 KG/M2 | SYSTOLIC BLOOD PRESSURE: 142 MMHG | HEART RATE: 78 BPM | HEIGHT: 65 IN | WEIGHT: 103 LBS

## 2022-09-08 DIAGNOSIS — Z00.00 ROUTINE GENERAL MEDICAL EXAMINATION AT A HEALTH CARE FACILITY: Primary | ICD-10-CM

## 2022-09-08 DIAGNOSIS — R63.6 UNDERWEIGHT: ICD-10-CM

## 2022-09-08 DIAGNOSIS — E03.8 OTHER SPECIFIED HYPOTHYROIDISM: ICD-10-CM

## 2022-09-08 DIAGNOSIS — N95.9 MENOPAUSAL AND POSTMENOPAUSAL DISORDER: ICD-10-CM

## 2022-09-08 LAB
BASOPHILS # BLD AUTO: 0 10E3/UL (ref 0–0.2)
BASOPHILS NFR BLD AUTO: 0 %
CHOLEST SERPL-MCNC: 209 MG/DL
EOSINOPHIL # BLD AUTO: 0 10E3/UL (ref 0–0.7)
EOSINOPHIL NFR BLD AUTO: 1 %
ERYTHROCYTE [DISTWIDTH] IN BLOOD BY AUTOMATED COUNT: 12.6 % (ref 10–15)
ESTRADIOL SERPL-MCNC: 125 PG/ML
HCT VFR BLD AUTO: 38.6 % (ref 35–47)
HDLC SERPL-MCNC: 108 MG/DL
HGB BLD-MCNC: 13 G/DL (ref 11.7–15.7)
IMM GRANULOCYTES # BLD: 0 10E3/UL
IMM GRANULOCYTES NFR BLD: 0 %
LDLC SERPL CALC-MCNC: 85 MG/DL
LYMPHOCYTES # BLD AUTO: 1.5 10E3/UL (ref 0.8–5.3)
LYMPHOCYTES NFR BLD AUTO: 35 %
MCH RBC QN AUTO: 33.2 PG (ref 26.5–33)
MCHC RBC AUTO-ENTMCNC: 33.7 G/DL (ref 31.5–36.5)
MCV RBC AUTO: 99 FL (ref 78–100)
MONOCYTES # BLD AUTO: 0.4 10E3/UL (ref 0–1.3)
MONOCYTES NFR BLD AUTO: 9 %
NEUTROPHILS # BLD AUTO: 2.3 10E3/UL (ref 1.6–8.3)
NEUTROPHILS NFR BLD AUTO: 54 %
NONHDLC SERPL-MCNC: 101 MG/DL
PLATELET # BLD AUTO: 176 10E3/UL (ref 150–450)
RBC # BLD AUTO: 3.91 10E6/UL (ref 3.8–5.2)
TRIGL SERPL-MCNC: 82 MG/DL
TSH SERPL DL<=0.005 MIU/L-ACNC: 3.54 UIU/ML (ref 0.3–4.2)
WBC # BLD AUTO: 4.2 10E3/UL (ref 4–11)

## 2022-09-08 PROCEDURE — 36415 COLL VENOUS BLD VENIPUNCTURE: CPT | Performed by: FAMILY MEDICINE

## 2022-09-08 PROCEDURE — 80050 GENERAL HEALTH PANEL: CPT | Performed by: FAMILY MEDICINE

## 2022-09-08 PROCEDURE — 82670 ASSAY OF TOTAL ESTRADIOL: CPT | Performed by: FAMILY MEDICINE

## 2022-09-08 PROCEDURE — 99396 PREV VISIT EST AGE 40-64: CPT | Performed by: FAMILY MEDICINE

## 2022-09-08 PROCEDURE — 80061 LIPID PANEL: CPT | Performed by: FAMILY MEDICINE

## 2022-09-08 ASSESSMENT — ENCOUNTER SYMPTOMS
NERVOUS/ANXIOUS: 0
ABDOMINAL PAIN: 0
DYSURIA: 0
COUGH: 0
HEADACHES: 0
MYALGIAS: 0
CHILLS: 0
HEARTBURN: 0
WEAKNESS: 0
SHORTNESS OF BREATH: 0
BREAST MASS: 0
PALPITATIONS: 0
HEMATURIA: 0
CONSTIPATION: 0
HEMATOCHEZIA: 0
SORE THROAT: 0
DIARRHEA: 0
PARESTHESIAS: 0
NAUSEA: 0
EYE PAIN: 0
FEVER: 0
FREQUENCY: 0
ARTHRALGIAS: 0
DIZZINESS: 0
JOINT SWELLING: 0

## 2022-09-08 NOTE — PROGRESS NOTES
SUBJECTIVE:   CC: Shelby Cruz is an 56 year old woman who presents for preventive health visit.       Patient has been advised of split billing requirements and indicates understanding: Yes  Healthy Habits:     Getting at least 3 servings of Calcium per day:  Yes    Bi-annual eye exam:  Yes    Dental care twice a year:  Yes    Sleep apnea or symptoms of sleep apnea:  None    Diet:  Regular (no restrictions)    Frequency of exercise:  6-7 days/week    Duration of exercise:  45-60 minutes    Taking medications regularly:  Yes    PHQ-2 Total Score: 0    Additional concerns today:  No      PROBLEMS TO ADD ON...  She continues to stay active, she does do yoga, weightbearing activities.  Blood pressure has been mildly elevated, but no symptoms like chest pain, shortness of breath, dizziness.  Still taking Synthroid for hypothyroidism.  Total abdominal hysterectomy secondary to ovarian cancer 1989, chemotherapy for Burkitt lymphoma.Postmenopausal symptoms have been managed with estradiol 2 mg daily.  Has been on the same dose for years.  Today's PHQ-2 Score:   PHQ-2 ( 1999 Pfizer) 9/8/2022   Q1: Little interest or pleasure in doing things 0   Q2: Feeling down, depressed or hopeless 0   PHQ-2 Score 0   Q1: Little interest or pleasure in doing things Not at all   Q2: Feeling down, depressed or hopeless Not at all   PHQ-2 Score 0       Abuse: Current or Past (Physical, Sexual or Emotional) - No  Do you feel safe in your environment? Yes        Social History     Tobacco Use     Smoking status: Never Smoker     Smokeless tobacco: Never Used   Substance Use Topics     Alcohol use: Yes     Comment: 3 drinks a week     If you drink alcohol do you typically have >3 drinks per day or >7 drinks per week? No    Alcohol Use 9/8/2022   Prescreen: >3 drinks/day or >7 drinks/week? No   Prescreen: >3 drinks/day or >7 drinks/week? -       Reviewed orders with patient.  Reviewed health maintenance and updated orders accordingly  - Yes  Lab work is in process  Labs reviewed in EPIC  BP Readings from Last 3 Encounters:   09/08/22 (!) 142/82   12/08/21 124/76   08/18/21 134/80    Wt Readings from Last 3 Encounters:   09/08/22 46.7 kg (103 lb)   08/18/21 48.1 kg (106 lb)   01/24/21 48.1 kg (106 lb)                  Patient Active Problem List   Diagnosis     Menopausal and postmenopausal disorder     Hypothyroidism     Disorder of bone and cartilage     Abnormal results of kidney function studies     CARDIOVASCULAR SCREENING; LDL GOAL LESS THAN 160     Underweight     Change of dressing     Past Surgical History:   Procedure Laterality Date     C DEXA, BONE DENSITY, AXIAL SKEL  12/05     HYSTERECTOMY       HYSTERECTOMY TOTAL ABDOMINAL, BILATERAL SALPINGO-OOPHORECTOMY, COMBINED  1989     OOPHORECTOMY       SURGICAL HISTORY OF -   1990    BMT, donated by brother     SURGICAL HISTORY OF -       cranial port-a cath       Social History     Tobacco Use     Smoking status: Never Smoker     Smokeless tobacco: Never Used   Substance Use Topics     Alcohol use: Yes     Comment: 3 drinks a week     Family History   Problem Relation Age of Onset     Family History Negative No family hx of      Alcoholism Father      Arthritis Father      Hyperlipidemia Father      Cerebrovascular Disease Paternal Grandmother      Alcoholism Paternal Grandfather      Depression Paternal Grandfather      Hyperlipidemia Paternal Grandfather          Current Outpatient Medications   Medication Sig Dispense Refill     cholecalciferol (VITAMIN D3) 1250 mcg (74781 units) capsule Take 1,250 mcg by mouth every 7 days        estradiol (ESTRACE) 2 MG tablet TAKE 1 TABLET BY MOUTH DAILY 90 tablet 0     levothyroxine (SYNTHROID/LEVOTHROID) 112 MCG tablet TAKE 1 TABLET BY MOUTH EVERY DAY 30 tablet 0     MULTI-VITAMIN OR TABS ONE TABLET DAILY 100 Tab 3     Allergies   Allergen Reactions     No Known Drug Allergies      Recent Labs   Lab Test 01/24/21  1612 01/20/21  1101 11/25/19  1416  04/17/18  1119   LDL  --  108 87 109   HDL  --  87 86 82   TRIG  --  96 71 88   ALT  --  17 17 20   CR 1.09* 1.13* 1.09 1.07   GFRESTIMATED 58* 50* 53* 54*   GFRESTBLACK 67 >60 >60 >60   POTASSIUM 4.1 4.2 4.2 4.5   TSH  --  4.08 1.44 4.65        Breast Cancer Screening:    Breast CA Risk Assessment (FHS-7) 9/8/2022   Do you have a family history of breast, colon, or ovarian cancer? No / Unknown         Mammogram Screening: Recommended mammography every 1-2 years with patient discussion and risk factor consideration  Pertinent mammograms are reviewed under the imaging tab.    History of abnormal Pap smear: Status post benign hysterectomy. Health Maintenance and Surgical History updated.  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 Involvement of CNS, thyroid, ovary. Underwent LOLITA/BSO in addition to intrathecal and systemic chemotherapy. Subsequent intra-abdominal or paraspinal resection.     Hx antineoplastic chemotherapy      Hx of radiation therapy      Unspecified hypothyroidism       Past Surgical History:   Procedure Laterality Date     C DEXA, BONE DENSITY, AXIAL SKEL  12/05     HYSTERECTOMY       HYSTERECTOMY TOTAL ABDOMINAL, BILATERAL SALPINGO-OOPHORECTOMY, COMBINED  1989     OOPHORECTOMY       SURGICAL HISTORY OF -   1990    BMT, donated by brother     SURGICAL HISTORY OF -       cranial port-a cath     OB History   No obstetric history on file.       Review of Systems   Constitutional: Negative for chills and fever.   HENT: Negative for congestion, ear pain, hearing loss and sore throat.    Eyes: Negative for pain and visual disturbance.   Respiratory: Negative for cough and  "shortness of breath.    Cardiovascular: Negative for chest pain, palpitations and peripheral edema.   Gastrointestinal: Negative for abdominal pain, constipation, diarrhea, heartburn, hematochezia and nausea.   Breasts:  Negative for tenderness, breast mass and discharge.   Genitourinary: Negative for dysuria, frequency, genital sores, hematuria, pelvic pain, urgency, vaginal bleeding and vaginal discharge.   Musculoskeletal: Negative for arthralgias, joint swelling and myalgias.   Skin: Negative for rash.   Neurological: Negative for dizziness, weakness, headaches and paresthesias.   Psychiatric/Behavioral: Negative for mood changes. The patient is not nervous/anxious.           OBJECTIVE:   BP (!) 144/90 (BP Location: Left arm, Patient Position: Sitting, Cuff Size: Adult Regular)   Pulse 78   Ht 1.65 m (5' 4.96\")   Wt 46.7 kg (103 lb)   BMI 17.16 kg/m    Physical Exam  GENERAL APPEARANCE: healthy, alert and no distress  EYES: Eyes grossly normal to inspection, PERRL and conjunctivae and sclerae normal  HENT: ear canals and TM's normal, nose and mouth without ulcers or lesions, oropharynx clear and oral mucous membranes moist  NECK: no adenopathy, no asymmetry, masses, or scars and thyroid normal to palpation  RESP: lungs clear to auscultation - no rales, rhonchi or wheezes  BREAST: normal without masses, tenderness or nipple discharge and no palpable axillary masses or adenopathy  CV: regular rate and rhythm, normal S1 S2, no S3 or S4, no murmur, click or rub, no peripheral edema and peripheral pulses strong  ABDOMEN: soft, nontender, no hepatosplenomegaly, no masses and bowel sounds normal  MS: no musculoskeletal defects are noted and gait is age appropriate without ataxia  SKIN: no suspicious lesions or rashes  NEURO: Normal strength and tone, sensory exam grossly normal, mentation intact and speech normal  PSYCH: mentation appears normal and affect normal/bright    Diagnostic Test Results:  Labs reviewed in " "Epic    ASSESSMENT/PLAN:   (Z00.00) Routine general medical examination at a health care facility  (primary encounter diagnosis)  Comment:   Plan:     (R63.6) Underweight  Comment:   Plan:     (N95.9) Menopausal and postmenopausal disorder  Comment:   Plan:     (E03.8) Other specified hypothyroidism  Comment:   Plan:     Blood pressure mildly elevated, discussed healthy lifestyle changes, regular physical activities, decrease salt intake, possibly decrease EstroGel to 1 mg daily, consider pharmacological therapy if not adequately controlled.  Status post ovarian cancer with Burkitt lymphoma treatment years ago, overall doing fine.  Mild hypothyroidism on Synthroid.  Has seen vascular specialist for varicose vein.    Patient has been advised of split billing requirements and indicates understanding: No    COUNSELING:  Reviewed preventive health counseling, as reflected in patient instructions       Regular exercise       Healthy diet/nutrition       Vision screening       Hearing screening       Osteoporosis prevention/bone health    Estimated body mass index is 17.16 kg/m  as calculated from the following:    Height as of this encounter: 1.65 m (5' 4.96\").    Weight as of this encounter: 46.7 kg (103 lb).    Weight management plan noted, stable and monitoring    She reports that she has never smoked. She has never used smokeless tobacco.      Counseling Resources:  ATP IV Guidelines  Pooled Cohorts Equation Calculator  Breast Cancer Risk Calculator  BRCA-Related Cancer Risk Assessment: FHS-7 Tool  FRAX Risk Assessment  ICSI Preventive Guidelines  Dietary Guidelines for Americans, 2010  USDA's MyPlate  ASA Prophylaxis  Lung CA Screening    Tracy Jones MD  [facility]  "

## 2022-09-09 LAB
ALBUMIN SERPL BCG-MCNC: 4.7 G/DL (ref 3.5–5.2)
ALP SERPL-CCNC: 57 U/L (ref 35–104)
ALT SERPL W P-5'-P-CCNC: 20 U/L (ref 10–35)
ANION GAP SERPL CALCULATED.3IONS-SCNC: 18 MMOL/L (ref 7–15)
AST SERPL W P-5'-P-CCNC: 45 U/L (ref 10–35)
BILIRUB SERPL-MCNC: 0.5 MG/DL
BUN SERPL-MCNC: 15.9 MG/DL (ref 6–20)
CALCIUM SERPL-MCNC: 9.5 MG/DL (ref 8.6–10)
CHLORIDE SERPL-SCNC: 100 MMOL/L (ref 98–107)
CREAT SERPL-MCNC: 0.99 MG/DL (ref 0.51–0.95)
DEPRECATED HCO3 PLAS-SCNC: 21 MMOL/L (ref 22–29)
GFR SERPL CREATININE-BSD FRML MDRD: 67 ML/MIN/1.73M2
GLUCOSE SERPL-MCNC: 71 MG/DL (ref 70–99)
POTASSIUM SERPL-SCNC: 4.2 MMOL/L (ref 3.4–5.3)
PROT SERPL-MCNC: 6.9 G/DL (ref 6.4–8.3)
SODIUM SERPL-SCNC: 139 MMOL/L (ref 136–145)

## 2022-09-16 DIAGNOSIS — E03.9 HYPOTHYROIDISM: ICD-10-CM

## 2022-09-16 RX ORDER — LEVOTHYROXINE SODIUM 112 UG/1
112 TABLET ORAL DAILY
Qty: 90 TABLET | Refills: 3 | Status: SHIPPED | OUTPATIENT
Start: 2022-09-16 | End: 2024-04-23

## 2022-09-16 NOTE — TELEPHONE ENCOUNTER
"Last Written Prescription Date:  7/5/22  Last Fill Quantity: 30,  # refills: 0   Last office visit provider:  9/8/22     Requested Prescriptions   Pending Prescriptions Disp Refills     levothyroxine (SYNTHROID/LEVOTHROID) 112 MCG tablet [Pharmacy Med Name: LEVOTHYROXINE 0.112MG (112MCG) TABS] 30 tablet 0     Sig: TAKE 1 TABLET BY MOUTH EVERY DAY       Thyroid Protocol Passed - 9/16/2022  3:40 AM        Passed - Patient is 12 years or older        Passed - Recent (12 mo) or future (30 days) visit within the authorizing provider's specialty     Patient has had an office visit with the authorizing provider or a provider within the authorizing providers department within the previous 12 mos or has a future within next 30 days. See \"Patient Info\" tab in inbasket, or \"Choose Columns\" in Meds & Orders section of the refill encounter.              Passed - Medication is active on med list        Passed - Normal TSH on file in past 12 months     Recent Labs   Lab Test 09/08/22  1218   TSH 3.54              Passed - No active pregnancy on record     If patient is pregnant or has had a positive pregnancy test, please check TSH.          Passed - No positive pregnancy test in past 12 months     If patient is pregnant or has had a positive pregnancy test, please check TSH.               Yonathan Smalls RN 09/16/22 3:19 PM  "

## 2023-03-29 ENCOUNTER — ANCILLARY PROCEDURE (OUTPATIENT)
Dept: MAMMOGRAPHY | Facility: CLINIC | Age: 58
End: 2023-03-29
Attending: FAMILY MEDICINE
Payer: COMMERCIAL

## 2023-03-29 DIAGNOSIS — Z12.31 VISIT FOR SCREENING MAMMOGRAM: ICD-10-CM

## 2023-03-29 PROCEDURE — 77063 BREAST TOMOSYNTHESIS BI: CPT | Mod: TC | Performed by: RADIOLOGY

## 2023-03-29 PROCEDURE — 77067 SCR MAMMO BI INCL CAD: CPT | Mod: TC | Performed by: RADIOLOGY

## 2023-04-19 DIAGNOSIS — N95.9 MENOPAUSAL AND POSTMENOPAUSAL DISORDER: ICD-10-CM

## 2023-04-20 RX ORDER — ESTRADIOL 2 MG/1
TABLET ORAL
Qty: 90 TABLET | Refills: 0 | Status: SHIPPED | OUTPATIENT
Start: 2023-04-20 | End: 2023-11-01

## 2023-04-20 NOTE — TELEPHONE ENCOUNTER
"Routing refill request to provider for review/approval because:  bp out of range    Last Written Prescription Date:  7/12/22  Last Fill Quantity: 90,  # refills: 0   Last office visit provider:  9/8/22     Requested Prescriptions   Pending Prescriptions Disp Refills     estradiol (ESTRACE) 2 MG tablet [Pharmacy Med Name: ESTRADIOL 2MG TABLETS] 90 tablet 0     Sig: TAKE 1 TABLET BY MOUTH DAILY       Hormone Replacement Therapy Failed - 4/19/2023  3:39 AM        Failed - Blood pressure under 140/90 in past 12 months     BP Readings from Last 3 Encounters:   09/08/22 (!) 142/82   12/08/21 124/76   08/18/21 134/80                 Passed - Recent (12 mo) or future (30 days) visit within the authorizing provider's specialty     Patient has had an office visit with the authorizing provider or a provider within the authorizing providers department within the previous 12 mos or has a future within next 30 days. See \"Patient Info\" tab in inbasket, or \"Choose Columns\" in Meds & Orders section of the refill encounter.              Passed - Patient has mammogram in past 2 years on file if age 50-75        Passed - Medication is active on med list        Passed - Patient is 18 years of age or older        Passed - No active pregnancy on record        Passed - No positive pregnancy test on record in past 12 months             Lianne Bradford RN 04/19/23 7:18 PM  "

## 2023-08-09 ENCOUNTER — PATIENT OUTREACH (OUTPATIENT)
Dept: CARE COORDINATION | Facility: CLINIC | Age: 58
End: 2023-08-09
Payer: COMMERCIAL

## 2023-08-23 ENCOUNTER — PATIENT OUTREACH (OUTPATIENT)
Dept: CARE COORDINATION | Facility: CLINIC | Age: 58
End: 2023-08-23
Payer: COMMERCIAL

## 2023-11-01 DIAGNOSIS — N95.9 MENOPAUSAL AND POSTMENOPAUSAL DISORDER: ICD-10-CM

## 2023-11-01 RX ORDER — ESTRADIOL 2 MG/1
TABLET ORAL
Qty: 90 TABLET | Refills: 0 | Status: SHIPPED | OUTPATIENT
Start: 2023-11-01

## 2023-11-18 ENCOUNTER — HEALTH MAINTENANCE LETTER (OUTPATIENT)
Age: 58
End: 2023-11-18

## 2024-04-05 ENCOUNTER — ANCILLARY PROCEDURE (OUTPATIENT)
Dept: MAMMOGRAPHY | Facility: CLINIC | Age: 59
End: 2024-04-05
Attending: FAMILY MEDICINE
Payer: COMMERCIAL

## 2024-04-05 DIAGNOSIS — Z12.31 VISIT FOR SCREENING MAMMOGRAM: ICD-10-CM

## 2024-04-05 PROCEDURE — 77067 SCR MAMMO BI INCL CAD: CPT | Mod: TC | Performed by: RADIOLOGY

## 2024-04-05 PROCEDURE — 77063 BREAST TOMOSYNTHESIS BI: CPT | Mod: TC | Performed by: RADIOLOGY

## 2024-12-03 ENCOUNTER — NURSE TRIAGE (OUTPATIENT)
Dept: FAMILY MEDICINE | Facility: CLINIC | Age: 59
End: 2024-12-03
Payer: COMMERCIAL

## 2024-12-03 NOTE — TELEPHONE ENCOUNTER
"SEE IN OFFICE WITHIN 3 DAYS:   * You need to be examined.   * Let me give you an appointment.  * Appointment scheduled for tomorrow, 12/4/24    Right breast enlarged lymph node and mild tingling pain / feeling X  1 month ago  fibrocystic breast disease  History of Hodgkin lymphoma in 1989    Michelle Patterson RN  [facility]      Reason for Disposition   Patient wants to be seen    Additional Information   Negative: Chest pain   Negative: Breastfeeding questions about baby   Negative: Breastfeeding questions about mother (breast symptoms or feeling sick)   Negative: Breastfeeding questions about mother's medicines and diet   Negative: Postpartum breast pain and swelling, not going to continue breastfeeding / pumping   Negative: Small spot, skin growth or mole   Negative: SEVERE breast pain and fever > 103 F  (39.4 C)   Negative: Patient sounds very sick or weak to the triager   Negative: Breast looks infected (spreading redness, feels hot or painful to touch) and fever   Negative: Breast looks infected (spreading redness, feels hot or painful to touch) and no fever   Negative: Painful rash and multiple small blisters grouped together (i.e., dermatomal distribution or \"band\" or \"stripe\")   Negative: Cuts, burns, or bruises of breasts and suspicious history for the injury   Negative: Breast lump   Negative: Nipple discharge and bloody   Negative: Nipple is inverted (i.e., points inward)  (Exception: Long-term physical characteristic, present for many years.)   Negative: Dry flaking-peeling skin of nipple   Negative: Change in shape or appearance of breast   Negative: Dimpling of skin of breast (e.g., skin is being pulled inward, or skin looks like the outside of an orange peel)    Answer Assessment - Initial Assessment Questions  1. SYMPTOM: \"What's the main symptom you're concerned about?\"  (e.g., lump, nipple discharge, pain, rash )     Right breast enlarged lymph node and mild tingling pain / " "feeling X  1 month ago  fibrocystic breast disease  History of Hodgkin lymphoma in 1989    2. LOCATION: \"Where is the located?\"      Right breast    3. ONSET: \"When did start?\"      1 month ago    4. PRIOR HISTORY: \"Do you have any history of prior problems with your breasts?\" (e.g., breast cancer, breast implant, fibrocystic breast disease)        fibrocystic breast disease  History of Hodgkin lymphoma in 1989    5. CAUSE: \"What do you think is causing this symptom?\"      Unknown    6. OTHER SYMPTOMS: \"Do you have any other symptoms?\" (e.g., fever, breast pain, nipple discharge, redness or rash)       Right breast enlarged lymph node and mild tingling pain / feeling        7. PREGNANCY-BREASTFEEDING: \"Is there any chance you are pregnant?\" \"When was your last menstrual period?\" \"Are you breastfeeding?\"      Hysterectomy    Protocols used: Breast Symptoms-A-[place]    "

## 2024-12-04 ENCOUNTER — OFFICE VISIT (OUTPATIENT)
Dept: FAMILY MEDICINE | Facility: CLINIC | Age: 59
End: 2024-12-04
Payer: COMMERCIAL

## 2024-12-04 ENCOUNTER — TELEPHONE (OUTPATIENT)
Dept: FAMILY MEDICINE | Facility: CLINIC | Age: 59
End: 2024-12-04

## 2024-12-04 VITALS
HEIGHT: 66 IN | HEART RATE: 105 BPM | OXYGEN SATURATION: 100 % | RESPIRATION RATE: 18 BRPM | TEMPERATURE: 97 F | SYSTOLIC BLOOD PRESSURE: 142 MMHG | BODY MASS INDEX: 17.36 KG/M2 | DIASTOLIC BLOOD PRESSURE: 90 MMHG | WEIGHT: 108 LBS

## 2024-12-04 DIAGNOSIS — N95.9 MENOPAUSAL AND POSTMENOPAUSAL DISORDER: ICD-10-CM

## 2024-12-04 DIAGNOSIS — E55.9 VITAMIN D DEFICIENCY: ICD-10-CM

## 2024-12-04 DIAGNOSIS — Z13.220 LIPID SCREENING: ICD-10-CM

## 2024-12-04 DIAGNOSIS — E03.9 HYPOTHYROIDISM, UNSPECIFIED TYPE: ICD-10-CM

## 2024-12-04 DIAGNOSIS — R22.31 RIGHT AXILLARY FULLNESS: Primary | ICD-10-CM

## 2024-12-04 LAB
ERYTHROCYTE [DISTWIDTH] IN BLOOD BY AUTOMATED COUNT: 12.2 % (ref 10–15)
HCT VFR BLD AUTO: 40.9 % (ref 35–47)
HGB BLD-MCNC: 13.5 G/DL (ref 11.7–15.7)
MCH RBC QN AUTO: 32.5 PG (ref 26.5–33)
MCHC RBC AUTO-ENTMCNC: 33 G/DL (ref 31.5–36.5)
MCV RBC AUTO: 98 FL (ref 78–100)
PLATELET # BLD AUTO: 178 10E3/UL (ref 150–450)
RBC # BLD AUTO: 4.16 10E6/UL (ref 3.8–5.2)
WBC # BLD AUTO: 5.5 10E3/UL (ref 4–11)

## 2024-12-04 PROCEDURE — 82306 VITAMIN D 25 HYDROXY: CPT | Performed by: FAMILY MEDICINE

## 2024-12-04 PROCEDURE — 80053 COMPREHEN METABOLIC PANEL: CPT | Performed by: FAMILY MEDICINE

## 2024-12-04 PROCEDURE — 36415 COLL VENOUS BLD VENIPUNCTURE: CPT | Performed by: FAMILY MEDICINE

## 2024-12-04 PROCEDURE — 99214 OFFICE O/P EST MOD 30 MIN: CPT | Performed by: FAMILY MEDICINE

## 2024-12-04 PROCEDURE — 80061 LIPID PANEL: CPT | Performed by: FAMILY MEDICINE

## 2024-12-04 PROCEDURE — 85027 COMPLETE CBC AUTOMATED: CPT | Performed by: FAMILY MEDICINE

## 2024-12-04 PROCEDURE — 84439 ASSAY OF FREE THYROXINE: CPT | Performed by: FAMILY MEDICINE

## 2024-12-04 PROCEDURE — 84443 ASSAY THYROID STIM HORMONE: CPT | Performed by: FAMILY MEDICINE

## 2024-12-04 ASSESSMENT — ENCOUNTER SYMPTOMS
COUGH: 0
ABDOMINAL PAIN: 0
ALLERGIC/IMMUNOLOGIC NEGATIVE: 1
SHORTNESS OF BREATH: 0
VOMITING: 0
EYE PAIN: 0
SEIZURES: 0
PALPITATIONS: 0
CHILLS: 0
FEVER: 0
SORE THROAT: 0
ENDOCRINE NEGATIVE: 1
DYSURIA: 0
ARTHRALGIAS: 0
COLOR CHANGE: 0
HEMATURIA: 0
BACK PAIN: 0
BRUISES/BLEEDS EASILY: 0

## 2024-12-04 NOTE — TELEPHONE ENCOUNTER
FYI - Status Update    Who is Calling: patient    Update: Patient called asking about clarification on the referral that Dr. Morales sent for a mammo but she would like more clairification as Imaging is telling her that the order is wrong?    Does caller want a call/response back: Yes     Could we send this information to you in [employer] or would you prefer to receive a phone call?:   Patient would prefer a phone call   Okay to leave a detailed message?: Yes at Cell number on file:    Telephone Information:   Mobile [phone]

## 2024-12-04 NOTE — PROGRESS NOTES
"  1. Right axillary fullness (Primary)  This is a 58 yo female with right axillary fullness, \"lump\" (no actual lump is palpable).  Patient had 3D mammogram last April (normal).  No family history of breast cancer; friend just diagnosed with breast cancer (adds to patient anxiety); h/o Burkitts lymphoma - had some radiation therapy - doesn't recall chest radiation).  Exam shows thick/dense/fibrocystic tissue in medial breast (s), with extension to axilla, but no distinct lump.  Discussed with patient.  Will order ultrasound to review (given previous history of lymphoma, anxiety).  Mammogram if necessary.  Discussed with patient.    - US Breast Right Limited 1-3 Quadrants; Future  - MA Screen Right w/Kieran; Future    2. Hypothyroidism, unspecified type  H/o hypothyroidism - is on replacement therapy - tolerating well - no recent labs.   - TSH; Future  - T4, free; Future  - TSH  - T4, free    3. Menopausal and postmenopausal disorder  Patient hasn't had any lab screening for some time - was told to come in for labs.  Does take estrogen for menopause - Dr. Jones told her to wean off this medication.  If she is symptomatic, okay to wean slowly.    - CBC with platelets; Future  - Comprehensive metabolic panel (BMP + Alb, Alk Phos, ALT, AST, Total. Bili, TP); Future  - CBC with platelets  - Comprehensive metabolic panel (BMP + Alb, Alk Phos, ALT, AST, Total. Bili, TP)    4. Vitamin D deficiency  H/o low Vitamin D - apparently took high dose Vitamin D in past.   - Vitamin D Deficiency; Future  - Vitamin D Deficiency    5. Lipid screening  Due for lipid screening - check lipids  - Lipid Profile (Chol, Trig, HDL, LDL calc); Future  - Lipid Profile (Chol, Trig, HDL, LDL calc)      Subjective   Suzi is a 59 year old, presenting for the following health issues:  Breast Mass (Pt stated feel something's like muscle on the right  breast)        12/4/2024     1:38 PM   Additional Questions   Roomed by Cindy     About a month of " "symptoms -   Had mammogram in April 2024 - normal   Family history - negative     Had cancer    History of Present Illness       Reason for visit:  Breast check  Symptom onset:  More than a month  Symptoms include:  Tingling  Symptom intensity:  Mild  Symptom progression:  Staying the same  Had these symptoms before:  No  What makes it worse:  No  What makes it better:  No   She is taking medications regularly.       Review of Systems   Constitutional:  Negative for chills and fever.   HENT:  Negative for ear pain and sore throat.    Eyes:  Negative for pain and visual disturbance.   Respiratory:  Negative for cough and shortness of breath.    Cardiovascular:  Negative for chest pain and palpitations.   Gastrointestinal:  Negative for abdominal pain and vomiting.   Endocrine: Negative.    Genitourinary:  Negative for dysuria and hematuria.   Musculoskeletal:  Negative for arthralgias and back pain.   Skin:  Negative for color change and rash.        Lump in left axilla     Allergic/Immunologic: Negative.    Neurological:  Negative for seizures and syncope.   Hematological:  Does not bruise/bleed easily.   Psychiatric/Behavioral:          Significant stress     All other systems reviewed and are negative.          Objective    BP (!) 142/90 (BP Location: Right arm, Patient Position: Sitting, Cuff Size: Adult Regular)   Pulse 105   Temp 97  F (36.1  C) (Temporal)   Resp 18   Ht 1.68 m (5' 6.14\")   Wt 49 kg (108 lb)   SpO2 100%   BMI 17.36 kg/m    Body mass index is 17.36 kg/m .  Physical Exam  Vitals reviewed.   Constitutional:       General: She is not in acute distress.     Appearance: Normal appearance.   HENT:      Head: Normocephalic.      Right Ear: Tympanic membrane, ear canal and external ear normal.      Left Ear: Tympanic membrane, ear canal and external ear normal.      Nose: Nose normal.      Mouth/Throat:      Mouth: Mucous membranes are moist.      Pharynx: No posterior oropharyngeal erythema. "   Eyes:      Extraocular Movements: Extraocular movements intact.      Conjunctiva/sclera: Conjunctivae normal.      Pupils: Pupils are equal, round, and reactive to light.   Cardiovascular:      Rate and Rhythm: Normal rate and regular rhythm.      Pulses: Normal pulses.      Heart sounds: Normal heart sounds. No murmur heard.  Pulmonary:      Effort: Pulmonary effort is normal.      Breath sounds: Normal breath sounds.   Chest:          Comments: Dense tissue - palpable, sl tender    Both breasts have fibrocystic feel - no distinct lumps    Abdominal:      Palpations: Abdomen is soft. There is no mass.      Tenderness: There is no abdominal tenderness. There is no guarding or rebound.   Musculoskeletal:         General: No deformity. Normal range of motion.      Cervical back: Normal range of motion and neck supple.   Lymphadenopathy:      Cervical: No cervical adenopathy.   Skin:     General: Skin is warm and dry.   Neurological:      General: No focal deficit present.      Mental Status: She is alert.   Psychiatric:         Mood and Affect: Mood normal.         Behavior: Behavior normal.            Results for orders placed or performed in visit on 12/04/24 (from the past 24 hours)   CBC with platelets   Result Value Ref Range    WBC Count 5.5 4.0 - 11.0 10e3/uL    RBC Count 4.16 3.80 - 5.20 10e6/uL    Hemoglobin 13.5 11.7 - 15.7 g/dL    Hematocrit 40.9 35.0 - 47.0 %    MCV 98 78 - 100 fL    MCH 32.5 26.5 - 33.0 pg    MCHC 33.0 31.5 - 36.5 g/dL    RDW 12.2 10.0 - 15.0 %    Platelet Count 178 150 - 450 10e3/uL           Signed Electronically by: JUAN GARCIA MD  Prior to immunization administration, verified patients identity using patient s name and date of birth. Please see Immunization Activity for additional information.     Screening Questionnaire for Adult Immunization    Are you sick today?   No   Do you have allergies to medications, food, a vaccine component or latex?   No   Have you  ever had a serious reaction after receiving a vaccination?   No   Do you have a long-term health problem with heart, lung, kidney, or metabolic disease (e.g., diabetes), asthma, a blood disorder, no spleen, complement component deficiency, a cochlear implant, or a spinal fluid leak?  Are you on long-term aspirin therapy?   No   Do you have cancer, leukemia, HIV/AIDS, or any other immune system problem?   No   Do you have a parent, brother, or sister with an immune system problem?   No   In the past 3 months, have you taken medications that affect  your immune system, such as prednisone, other steroids, or anticancer drugs; drugs for the treatment of rheumatoid arthritis, Crohn s disease, or psoriasis; or have you had radiation treatments?   No   Have you had a seizure, or a brain or other nervous system problem?   No   During the past year, have you received a transfusion of blood or blood    products, or been given immune (gamma) globulin or antiviral drug?   No   For women: Are you pregnant or is there a chance you could become       pregnant during the next month?   No   Have you received any vaccinations in the past 4 weeks?   No     Immunization questionnaire answers were all negative.      Patient instructed to remain in clinic for 15 minutes afterwards, and to report any adverse reactions.     Screening performed by Cindy Izaguirre on 12/4/2024 at 1:44 PM.

## 2024-12-05 ENCOUNTER — PATIENT OUTREACH (OUTPATIENT)
Dept: CARE COORDINATION | Facility: CLINIC | Age: 59
End: 2024-12-05
Payer: COMMERCIAL

## 2024-12-05 LAB
ALBUMIN SERPL BCG-MCNC: 4.7 G/DL (ref 3.5–5.2)
ALP SERPL-CCNC: 67 U/L (ref 40–150)
ALT SERPL W P-5'-P-CCNC: 22 U/L (ref 0–50)
ANION GAP SERPL CALCULATED.3IONS-SCNC: 12 MMOL/L (ref 7–15)
AST SERPL W P-5'-P-CCNC: 36 U/L (ref 0–45)
BILIRUB SERPL-MCNC: 0.3 MG/DL
BUN SERPL-MCNC: 19.7 MG/DL (ref 6–20)
CALCIUM SERPL-MCNC: 10 MG/DL (ref 8.8–10.4)
CHLORIDE SERPL-SCNC: 99 MMOL/L (ref 98–107)
CHOLEST SERPL-MCNC: 223 MG/DL
CREAT SERPL-MCNC: 1.08 MG/DL (ref 0.51–0.95)
EGFRCR SERPLBLD CKD-EPI 2021: 60 ML/MIN/1.73M2
FASTING STATUS PATIENT QL REPORTED: NO
FASTING STATUS PATIENT QL REPORTED: NO
GLUCOSE SERPL-MCNC: 143 MG/DL (ref 70–99)
HCO3 SERPL-SCNC: 26 MMOL/L (ref 22–29)
HDLC SERPL-MCNC: 119 MG/DL
LDLC SERPL CALC-MCNC: 91 MG/DL
NONHDLC SERPL-MCNC: 104 MG/DL
POTASSIUM SERPL-SCNC: 4.1 MMOL/L (ref 3.4–5.3)
PROT SERPL-MCNC: 7.2 G/DL (ref 6.4–8.3)
SODIUM SERPL-SCNC: 137 MMOL/L (ref 135–145)
T4 FREE SERPL-MCNC: 0.98 NG/DL (ref 0.9–1.7)
TRIGL SERPL-MCNC: 64 MG/DL
TSH SERPL DL<=0.005 MIU/L-ACNC: 7.22 UIU/ML (ref 0.3–4.2)
VIT D+METAB SERPL-MCNC: 59 NG/ML (ref 20–50)

## 2024-12-09 ENCOUNTER — ANCILLARY PROCEDURE (OUTPATIENT)
Dept: MAMMOGRAPHY | Facility: CLINIC | Age: 59
End: 2024-12-09
Attending: FAMILY MEDICINE
Payer: COMMERCIAL

## 2024-12-09 DIAGNOSIS — R22.31 RIGHT AXILLARY FULLNESS: ICD-10-CM

## 2024-12-09 PROCEDURE — 77065 DX MAMMO INCL CAD UNI: CPT | Mod: RT | Performed by: STUDENT IN AN ORGANIZED HEALTH CARE EDUCATION/TRAINING PROGRAM

## 2024-12-09 PROCEDURE — G0279 TOMOSYNTHESIS, MAMMO: HCPCS | Performed by: STUDENT IN AN ORGANIZED HEALTH CARE EDUCATION/TRAINING PROGRAM

## 2024-12-09 PROCEDURE — 76642 ULTRASOUND BREAST LIMITED: CPT | Mod: RT | Performed by: STUDENT IN AN ORGANIZED HEALTH CARE EDUCATION/TRAINING PROGRAM

## 2024-12-10 NOTE — TELEPHONE ENCOUNTER
Diagnostic mammogram and breast US yesterday, 12/9/24.  No further action needed    Michelle Patterson RN  [facility]

## 2024-12-29 ENCOUNTER — HEALTH MAINTENANCE LETTER (OUTPATIENT)
Age: 59
End: 2024-12-29

## 2025-02-24 ENCOUNTER — TELEPHONE (OUTPATIENT)
Dept: FAMILY MEDICINE | Facility: CLINIC | Age: 60
End: 2025-02-24

## 2025-02-24 ENCOUNTER — APPOINTMENT (OUTPATIENT)
Dept: LAB | Facility: CLINIC | Age: 60
End: 2025-02-24
Payer: COMMERCIAL

## 2025-02-24 NOTE — TELEPHONE ENCOUNTER
Patient calling.  Presented to  lab for TSH,  informed patient she had no lab orders.  Writer reviewed chart, advised patient writer is able to see TSH, T3, and T4 orders placed by Dr. Jones 1/31/25.    Patient expressed frustration about miscommunication with [facility], number of call transfers and hold times--overall effort needed to speak with RN from [facility]. Writer apologized that process is frustrating, provided direct clinic number as well as patient relations phone number.    Writer scheduled lab appt at [facility] later this afternoon, released orders for patient.   Patient asked about next steps. Writer relayed results will be sent to PCP, result message will be sent via [employer] as well as call from RN team, order will be sent to preferred pharmacy if levothyrozine dose adjustment is needed.    Patient verbalizes understanding and agreement with plan.    Rosalva Madrigal RN  [facility]

## 2025-02-26 DIAGNOSIS — N95.9 MENOPAUSAL AND POSTMENOPAUSAL DISORDER: ICD-10-CM

## 2025-02-26 NOTE — TELEPHONE ENCOUNTER
Medication Question or Refill    Contacts       Contact Date/Time Type Contact Phone/Fax    02/26/2025 03:40 AM CST Interface (Incoming) [id number] - [place], [place] - [address] AT [facility] [address] (Pharmacy) [phone]            What medication are you calling about (include dose and sig)?: Synthroid  Patient reported has been out for a week.  TSH drawn with result has yet to relay with medication adjustment if appropriate.  Please send medication to pharmacy below.     Preferred Pharmacy:      [facility] [id number] [facility] - [place], [place] - [address]  [address]  [place] [address]  Phone: [phone] Fax: [phone]     Controlled Substance Agreement on file:   CSA -- Patient Level:    CSA: None found at the patient level.       Who prescribed the medication?: Dr. Jones    Do you need a refill? Yes    When did you use the medication last? A week ago    Patient offered an appointment? No    Do you have any questions or concerns?  No      Could we send this information to you in [employer] or would you prefer to receive a phone call?:   Patient would prefer a phone call   Okay to leave a detailed message?: Yes at Cell number on file:    Telephone Information:   Mobile [phone]

## 2025-02-27 RX ORDER — ESTRADIOL 2 MG/1
TABLET ORAL
Qty: 30 TABLET | Refills: 0 | Status: SHIPPED | OUTPATIENT
Start: 2025-02-27

## 2025-02-28 ENCOUNTER — MYC REFILL (OUTPATIENT)
Dept: FAMILY MEDICINE | Facility: CLINIC | Age: 60
End: 2025-02-28
Payer: COMMERCIAL

## 2025-02-28 DIAGNOSIS — E03.9 HYPOTHYROIDISM: ICD-10-CM

## 2025-02-28 DIAGNOSIS — E03.9 HYPOTHYROIDISM, UNSPECIFIED TYPE: Primary | ICD-10-CM

## 2025-03-03 RX ORDER — LEVOTHYROXINE SODIUM 125 UG/1
125 TABLET ORAL DAILY
Qty: 90 TABLET | Refills: 1 | Status: SHIPPED | OUTPATIENT
Start: 2025-03-03

## 2025-03-04 RX ORDER — LEVOTHYROXINE SODIUM 112 UG/1
112 TABLET ORAL DAILY
Qty: 90 TABLET | Refills: 0 | OUTPATIENT
Start: 2025-03-04

## 2025-03-27 DIAGNOSIS — N95.9 MENOPAUSAL AND POSTMENOPAUSAL DISORDER: ICD-10-CM

## 2025-03-27 RX ORDER — ESTRADIOL 2 MG/1
2 TABLET ORAL
Qty: 30 TABLET | Refills: 0 | Status: SHIPPED | OUTPATIENT
Start: 2025-03-27

## 2025-03-27 NOTE — TELEPHONE ENCOUNTER
BP Readings from Last 3 Encounters:   12/04/24 (!) 142/90   09/08/22 (!) 142/82   12/08/21 124/76     Rosalva Madrigal RN  [facility]

## 2025-04-16 DIAGNOSIS — N95.9 MENOPAUSAL AND POSTMENOPAUSAL DISORDER: ICD-10-CM

## 2025-04-16 RX ORDER — ESTRADIOL 2 MG/1
2 TABLET ORAL
Qty: 30 TABLET | Refills: 0 | Status: SHIPPED | OUTPATIENT
Start: 2025-04-16 | End: 2025-05-15

## 2025-04-16 NOTE — LETTER
April 18, 2025      Shelyb Cruz  [address] [address]        Dear Shelby,           As a valued [facility] patient, your healthcare needs are our priority.    Your health care team has determined that you are due for an appointment for a medication check. We encourage you to call or schedule an appointment with your primary care provider to discuss your overdue screening and schedule an appointment.     If you already have had your screening performed at another health care facility, please ask that practice to send your results to [facility] [phone] and we will update your health records. This will ensure you receive the best possible care from our providers.      If you have any questions or need help with scheduling, please call the [facility] at [phone].        Yours in health,       Your care team at [facility]

## 2025-04-16 NOTE — TELEPHONE ENCOUNTER
Attempt #1. No answer. LVM to call clinic. Please relay Dr. Jones's message below upon return call.      Jovan GUAN RN  [facility]     for 24 hours after anesthesia/No

## 2025-04-16 NOTE — TELEPHONE ENCOUNTER
Medication Question or Refill    What medication are you calling about (include dose and sig)?: estradiol (ESTRACE) 2 MG tablet     Preferred Pharmacy:  [id number] [facility] - [place], [place] - [address]  [address] [place] [address]  Phone: [phone] Fax: [phone]    Who prescribed the medication?: Tracy Jones MD     Do you need a refill? Yes    Do you have any questions or concerns?  Yes: I need a refill    Could we send this information to you in [facility] or would you prefer to receive a phone call?:   Patient would prefer a phone call   Okay to leave a detailed message?: No at Cell number on file: [phone]    Routed to Dr. Jones to review.  Pended.    Jovan GUAN RN  [facility]

## 2025-06-13 ENCOUNTER — OFFICE VISIT (OUTPATIENT)
Dept: FAMILY MEDICINE | Facility: CLINIC | Age: 60
End: 2025-06-13
Payer: COMMERCIAL

## 2025-06-13 VITALS
SYSTOLIC BLOOD PRESSURE: 181 MMHG | BODY MASS INDEX: 17.49 KG/M2 | TEMPERATURE: 97.3 F | HEART RATE: 87 BPM | WEIGHT: 105 LBS | DIASTOLIC BLOOD PRESSURE: 104 MMHG | HEIGHT: 65 IN | OXYGEN SATURATION: 98 %

## 2025-06-13 DIAGNOSIS — Z00.00 ADULT GENERAL MEDICAL EXAM: Primary | ICD-10-CM

## 2025-06-13 DIAGNOSIS — I10 BENIGN ESSENTIAL HYPERTENSION: ICD-10-CM

## 2025-06-13 DIAGNOSIS — Z63.8 FAMILY DISCORD: ICD-10-CM

## 2025-06-13 DIAGNOSIS — E03.9 HYPOTHYROIDISM, UNSPECIFIED TYPE: ICD-10-CM

## 2025-06-13 DIAGNOSIS — R63.6 UNDERWEIGHT: ICD-10-CM

## 2025-06-13 LAB
ANION GAP SERPL CALCULATED.3IONS-SCNC: 12 MMOL/L (ref 7–15)
BUN SERPL-MCNC: 13.3 MG/DL (ref 6–20)
CALCIUM SERPL-MCNC: 11.4 MG/DL (ref 8.8–10.4)
CHLORIDE SERPL-SCNC: 101 MMOL/L (ref 98–107)
CREAT SERPL-MCNC: 1.04 MG/DL (ref 0.51–0.95)
EGFRCR SERPLBLD CKD-EPI 2021: 62 ML/MIN/1.73M2
GLUCOSE SERPL-MCNC: 98 MG/DL (ref 70–99)
HCO3 SERPL-SCNC: 24 MMOL/L (ref 22–29)
POTASSIUM SERPL-SCNC: 4.1 MMOL/L (ref 3.4–5.3)
SODIUM SERPL-SCNC: 137 MMOL/L (ref 135–145)
T4 FREE SERPL-MCNC: 1.6 NG/DL (ref 0.9–1.7)
TSH SERPL DL<=0.005 MIU/L-ACNC: 3.51 UIU/ML (ref 0.3–4.2)

## 2025-06-13 PROCEDURE — 99396 PREV VISIT EST AGE 40-64: CPT | Performed by: FAMILY MEDICINE

## 2025-06-13 PROCEDURE — 84443 ASSAY THYROID STIM HORMONE: CPT | Performed by: FAMILY MEDICINE

## 2025-06-13 PROCEDURE — 3077F SYST BP >= 140 MM HG: CPT | Performed by: FAMILY MEDICINE

## 2025-06-13 PROCEDURE — 80048 BASIC METABOLIC PNL TOTAL CA: CPT | Performed by: FAMILY MEDICINE

## 2025-06-13 PROCEDURE — 36415 COLL VENOUS BLD VENIPUNCTURE: CPT | Performed by: FAMILY MEDICINE

## 2025-06-13 PROCEDURE — 84439 ASSAY OF FREE THYROXINE: CPT | Performed by: FAMILY MEDICINE

## 2025-06-13 PROCEDURE — 3080F DIAST BP >= 90 MM HG: CPT | Performed by: FAMILY MEDICINE

## 2025-06-13 PROCEDURE — 99214 OFFICE O/P EST MOD 30 MIN: CPT | Mod: 25 | Performed by: FAMILY MEDICINE

## 2025-06-13 RX ORDER — METOPROLOL SUCCINATE 50 MG/1
50 TABLET, EXTENDED RELEASE ORAL DAILY
Qty: 90 TABLET | Refills: 1 | Status: SHIPPED | OUTPATIENT
Start: 2025-06-13

## 2025-06-13 RX ORDER — LEVOTHYROXINE SODIUM 125 UG/1
125 TABLET ORAL DAILY
Qty: 90 TABLET | Refills: 1 | Status: SHIPPED | OUTPATIENT
Start: 2025-06-13

## 2025-06-13 SDOH — HEALTH STABILITY: PHYSICAL HEALTH: ON AVERAGE, HOW MANY DAYS PER WEEK DO YOU ENGAGE IN MODERATE TO STRENUOUS EXERCISE (LIKE A BRISK WALK)?: 7 DAYS

## 2025-06-13 SDOH — SOCIAL STABILITY - SOCIAL INSECURITY: OTHER SPECIFIED PROBLEMS RELATED TO PRIMARY SUPPORT GROUP: Z63.8

## 2025-06-13 SDOH — HEALTH STABILITY: PHYSICAL HEALTH: ON AVERAGE, HOW MANY MINUTES DO YOU ENGAGE IN EXERCISE AT THIS LEVEL?: 30 MIN

## 2025-06-13 ASSESSMENT — SOCIAL DETERMINANTS OF HEALTH (SDOH): HOW OFTEN DO YOU GET TOGETHER WITH FRIENDS OR RELATIVES?: ONCE A WEEK

## 2025-06-13 NOTE — PROGRESS NOTES
Preventive Care Visit  [facility]  JUAN K MD JOSE, Family Medicine  Jun 13, 2025      1. Adult general medical exam (Primary)  This is a 60 yo female here for physical exam  Counseling   Appropriate preventive services were discussed with this patient, including applicable screening as appropriate for fall prevention, nutrition, physical activity, Tobacco-use cessation, weight loss and cognition.  Checklist reviewing preventive services available has been given to the patient.         2. Hypothyroidism, unspecified type  H/o hypothyroidism - wasn't able to get refills as she was told she needed to come to office for this.  Denies symptoms of hypo- or hyper-thyroid - renew levothyroxine ; check labs -   - levothyroxine (SYNTHROID/LEVOTHROID) 125 MCG tablet; Take 1 tablet (125 mcg) by mouth daily.  Dispense: 90 tablet; Refill: 1  - TSH; Future  - T4, free; Future  - TSH  - T4, free    3. Benign essential hypertension  Blood pressure is wildly elevated - has had a pattern of elevated Bps over last couple years.  Claims her wrist BP device at home reads much lower - really doesn't want to take medications, but we have eventually gotten to this point and I think this is dangerously high.  She really needs to take medication.  Discussed with patient - will start Metoprolol.  Discussed the impact this medication may have on her exercise.    - Basic metabolic panel  (Ca, Cl, CO2, Creat, Gluc, K, Na, BUN); Future  - Basic metabolic panel  (Ca, Cl, CO2, Creat, Gluc, K, Na, BUN)  - metoprolol succinate ER (TOPROL XL) 50 MG 24 hr tablet; Take 1 tablet (50 mg) by mouth daily.  Dispense: 90 tablet; Refill: 1    4. Underweight  Body mass index is 17.49 kg/m .  Patient notes that she goes to the gym every day to escape her family life - discussed that it would be okay to gain some weight, and to skip an occasional day of exercise.      5. Family discord  Patient became quite agitated when  "discussing her current life.  She doesn't like her  - feels he is too controlling - she cannot go anywhere alone; he has started tracking her activity.  She can't stand it - but \"can't leave\".  She has an adult son who lost his job and has moved back home - spending his whole day in bed.  She feels like her  is an enabler.  Doesn't think her son is motivated to do anything - they've taken away his phone, his car, and won't forward him any money to do things.   They have one daughter who is 16 and doesn't have any respect for patient (by report).  These are challenging relationships for the patient.    Apparently her  called the police last week when patient left the house and they brought her to [facility] - she spent the night having mental health screening - they discharged her in the middle of the night and she slept in the back seat of her car in the garage.    After at least 40 minutes with patient, I let her know that I needed to move on to another patient.  She stayed in the exam room.  After that she was heard to be screaming (very loudly) - apparently to family member () about all of her anger.  She did not feel safe to drive - repeatedly emphasized she was safe at home, but with her blood pressure elevation, thought she should not drive (but then didn't want to treat the blood rpessure with medications).  I made multiple visits back to the room, to discuss with patient.  (As did my staff).  Eventually she calmed down, agreed she was ready to leave - rejected any other discharge plans (I.e., to shelter or other safe place or to ER).  Discussed options with patient.      Well over 45 minutes spent with patient - 90% of this in counseling time.        Subjective   Suzi is a 59 year old, presenting for the following:  Hypertension and Physical        6/13/2025     1:26 PM   Additional Questions   Roomed by M   Accompanied by self          Blood pressure - wrist cuff -at home 150/90 (new " batteries)  Hasn't ever been on BP medication   Fam history of high BP (dad)    Lots of stress -    won't let her go anywhere by herself   Son is back now  Son lays in bed all day - lost his job in April -    is more of an enabler  Daughter is 16 - mean                 Advance Care Planning    No written documents         6/13/2025   General Health   How would you rate your overall physical health? Good   Feel stress (tense, anxious, or unable to sleep) Rather much   (!) STRESS CONCERN      6/13/2025   Nutrition   Three or more servings of calcium each day? Yes   Diet: Regular (no restrictions)   How many servings of fruit and vegetables per day? 4 or more   How many sweetened beverages each day? 0-1         6/13/2025   Exercise   Days per week of moderate/strenous exercise 7 days   Average minutes spent exercising at this level 30 min         6/13/2025   Social Factors   Frequency of gathering with friends or relatives Once a week   Worry food won't last until get money to buy more No   Food not last or not have enough money for food? No   Do you have housing? (Housing is defined as stable permanent housing and does not include staying outside in a car, in a tent, in an abandoned building, in an overnight shelter, or couch-surfing.) Yes   Are you worried about losing your housing? No   Lack of transportation? No   Unable to get utilities (heat,electricity)? No         6/13/2025   Fall Risk   Fallen 2 or more times in the past year? No   Trouble with walking or balance? No          6/13/2025   Dental   Dentist two times every year? Yes         Today's PHQ-2 Score:       6/13/2025     1:19 PM   PHQ-2 ( 1999 [employer])   Q1: Little interest or pleasure in doing things 0   Q2: Feeling down, depressed or hopeless 1   PHQ-2 Score 1    Q1: Little interest or pleasure in doing things Not at all   Q2: Feeling down, depressed or hopeless Several days   PHQ-2 Score 1       Patient-reported           6/13/2025    Substance Use   Alcohol more than 3/day or more than 7/wk No   Do you use any other substances recreationally? No     Social History     Tobacco Use    Smoking status: Never    Smokeless tobacco: Never   Substance Use Topics    Alcohol use: Yes     Comment: 3 drinks a week    Drug use: No           12/9/2024   LAST FHS-7 RESULTS   1st degree relative breast or ovarian cancer No   Any relative bilateral breast cancer No   Any male have breast cancer No   Any ONE woman have BOTH breast AND ovarian cancer No   Any woman with breast cancer before 50yrs No   2 or more relatives with breast AND/OR ovarian cancer No   2 or more relatives with breast AND/OR bowel cancer No        Mammogram Screening - Mammogram every 1-2 years updated in Health Maintenance based on mutual decision making        6/13/2025   STI Screening   New sexual partner(s) since last STI/HIV test? 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 Involvement of CNS, thyroid, ovary. Underwent LOLITA/BSO in addition to intrathecal and systemic chemotherapy. Subsequent intra-abdominal or paraspinal resection.    Hx antineoplastic chemotherapy     Hx of radiation therapy     Unspecified hypothyroidism      Past Surgical History:   Procedure Laterality Date    C DEXA, BONE DENSITY, AXIAL SKEL  12/05    HYSTERECTOMY      HYSTERECTOMY TOTAL ABDOMINAL, BILATERAL SALPINGO-OOPHORECTOMY, COMBINED  1989    OOPHORECTOMY      SURGICAL HISTORY OF -   1990    BMT,  donated by brother    SURGICAL HISTORY OF -       cranial port-a cath     OB History   No obstetric history on file.     BP Readings from Last 3 Encounters:   06/13/25 (!) 181/104   12/04/24 (!) 142/90   09/08/22 (!) 142/82    Wt Readings from Last 3 Encounters:   06/13/25 47.6 kg (105 lb)   12/04/24 49 kg (108 lb)   09/08/22 46.7 kg (103 lb)                  Patient Active Problem List   Diagnosis    Menopausal and postmenopausal disorder    Hypothyroidism    Disorder of bone and cartilage    Abnormal results of kidney function studies    CARDIOVASCULAR SCREENING; LDL GOAL LESS THAN 160    Underweight    Change of dressing     Past Surgical History:   Procedure Laterality Date    C DEXA, BONE DENSITY, AXIAL SKEL  12/05    HYSTERECTOMY      HYSTERECTOMY TOTAL ABDOMINAL, BILATERAL SALPINGO-OOPHORECTOMY, COMBINED  1989    OOPHORECTOMY      SURGICAL HISTORY OF -   1990    BMT, donated by brother    SURGICAL HISTORY OF -       cranial port-a cath       Social History     Tobacco Use    Smoking status: Never    Smokeless tobacco: Never   Substance Use Topics    Alcohol use: Yes     Comment: 3 drinks a week     Family History   Problem Relation Age of Onset    Family History Negative No family hx of     Alcoholism Father     Arthritis Father     Hyperlipidemia Father     Cerebrovascular Disease Paternal Grandmother     Alcoholism Paternal Grandfather     Depression Paternal Grandfather     Hyperlipidemia Paternal Grandfather          Current Outpatient Medications   Medication Sig Dispense Refill    levothyroxine (SYNTHROID/LEVOTHROID) 125 MCG tablet Take 1 tablet (125 mcg) by mouth daily. 90 tablet 1    metoprolol succinate ER (TOPROL XL) 50 MG 24 hr tablet Take 1 tablet (50 mg) by mouth daily. 90 tablet 1    MULTI-VITAMIN OR TABS ONE TABLET DAILY 100 Tab 3    estradiol (ESTRACE) 2 MG tablet TAKE 1 TABLET (2 MG) BY MOUTH DAILY AT 2 PM. 30 tablet 0     Allergies   Allergen Reactions    No Known Drug Allergy      Recent  "Labs   Lab Test 06/13/25  1454 02/24/25  1519 12/04/24  1439 09/08/22  1218 01/24/21  1612 01/20/21  1101 01/20/21  1101   LDL  --   --  91 85  --   --  108   HDL  --   --  119 108  --   --  87   TRIG  --   --  64 82  --   --  96   ALT  --   --  22 20  --   --  17   CR 1.04*  --  1.08* 0.99* 1.09*  --  1.13*   GFRESTIMATED 62  --  60* 67 58*   < > 50*   GFRESTBLACK  --   --   --   --  67  --  >60   POTASSIUM 4.1  --  4.1 4.2 4.1   < > 4.2   TSH 3.51 9.05* 7.22* 3.54  --    < > 4.08    < > = values in this interval not displayed.        Review of Systems   Constitutional:  Negative for chills and fever.   HENT:  Negative for ear pain and sore throat.    Eyes:  Negative for pain and visual disturbance.   Respiratory:  Negative for cough and shortness of breath.    Cardiovascular:  Negative for chest pain and palpitations.   Gastrointestinal:  Negative for abdominal pain and vomiting.   Genitourinary:  Negative for dysuria and hematuria.   Musculoskeletal:  Negative for arthralgias and back pain.   Skin:  Negative for color change and rash.   Neurological:  Negative for seizures and syncope.   Hematological:  Does not bruise/bleed easily.   Psychiatric/Behavioral:  Positive for agitation, dysphoric mood and sleep disturbance. Negative for self-injury and suicidal ideas. The patient is nervous/anxious.    All other systems reviewed and are negative.         Objective    Exam  BP (!) 189/105 (BP Location: Left arm, Patient Position: Sitting, Cuff Size: Adult Small)   Pulse 87   Temp 97.3  F (36.3  C) (Temporal)   Ht 1.65 m (5' 4.96\")   Wt 47.6 kg (105 lb)   SpO2 98%   BMI 17.49 kg/m     Estimated body mass index is 17.49 kg/m  as calculated from the following:    Height as of this encounter: 1.65 m (5' 4.96\").    Weight as of this encounter: 47.6 kg (105 lb).    Physical Exam  Vitals reviewed.   Constitutional:       General: She is not in acute distress (emotional distress).     Appearance: Normal appearance. "   HENT:      Head: Normocephalic.      Right Ear: Tympanic membrane, ear canal and external ear normal.      Left Ear: Tympanic membrane, ear canal and external ear normal.      Nose: Nose normal.      Mouth/Throat:      Mouth: Mucous membranes are moist.      Pharynx: No posterior oropharyngeal erythema.   Eyes:      Extraocular Movements: Extraocular movements intact.      Conjunctiva/sclera: Conjunctivae normal.      Pupils: Pupils are equal, round, and reactive to light.   Cardiovascular:      Rate and Rhythm: Normal rate and regular rhythm.      Pulses: Normal pulses.      Heart sounds: Normal heart sounds. No murmur heard.  Pulmonary:      Effort: Pulmonary effort is normal.      Breath sounds: Normal breath sounds.   Abdominal:      Palpations: Abdomen is soft. There is no mass.      Tenderness: There is no abdominal tenderness. There is no guarding or rebound.   Musculoskeletal:         General: No deformity. Normal range of motion.      Cervical back: Normal range of motion and neck supple.   Lymphadenopathy:      Cervical: No cervical adenopathy.   Skin:     General: Skin is warm and dry.   Neurological:      General: No focal deficit present.      Mental Status: She is alert and oriented to person, place, and time.   Psychiatric:      Comments: Agitated, crying, yelling        Results for orders placed or performed in visit on 06/13/25   TSH     Status: Normal   Result Value Ref Range    TSH 3.51 0.30 - 4.20 uIU/mL   T4, free     Status: Normal   Result Value Ref Range    Free T4 1.60 0.90 - 1.70 ng/dL   Basic metabolic panel  (Ca, Cl, CO2, Creat, Gluc, K, Na, BUN)     Status: Abnormal   Result Value Ref Range    Sodium 137 135 - 145 mmol/L    Potassium 4.1 3.4 - 5.3 mmol/L    Chloride 101 98 - 107 mmol/L    Carbon Dioxide (CO2) 24 22 - 29 mmol/L    Anion Gap 12 7 - 15 mmol/L    Urea Nitrogen 13.3 6.0 - 20.0 mg/dL    Creatinine 1.04 (H) 0.51 - 0.95 mg/dL    GFR Estimate 62 >60 mL/min/1.73m2    Calcium  11.4 (H) 8.8 - 10.4 mg/dL    Glucose 98 70 - 99 mg/dL             Signed Electronically by: JUAN GARCIA MD

## 2025-06-13 NOTE — PROGRESS NOTES
Prior to immunization administration, verified patients identity using patient s name and date of birth. Please see Immunization Activity for additional information.     Screening Questionnaire for Adult Immunization    Are you sick today?   No   Do you have allergies to medications, food, a vaccine component or latex?   No   Have you ever had a serious reaction after receiving a vaccination?   No   Do you have a long-term health problem with heart, lung, kidney, or metabolic disease (e.g., diabetes), asthma, a blood disorder, no spleen, complement component deficiency, a cochlear implant, or a spinal fluid leak?  Are you on long-term aspirin therapy?   No   Do you have cancer, leukemia, HIV/AIDS, or any other immune system problem?   No   Do you have a parent, brother, or sister with an immune system problem?   No   In the past 3 months, have you taken medications that affect  your immune system, such as prednisone, other steroids, or anticancer drugs; drugs for the treatment of rheumatoid arthritis, Crohn s disease, or psoriasis; or have you had radiation treatments?   No   Have you had a seizure, or a brain or other nervous system problem?   No   During the past year, have you received a transfusion of blood or blood    products, or been given immune (gamma) globulin or antiviral drug?   No   For women: Are you pregnant or is there a chance you could become       pregnant during the next month?   No   Have you received any vaccinations in the past 4 weeks?   No     Immunization questionnaire answers were all negative.      Patient instructed to remain in clinic for 15 minutes afterwards, and to report any adverse reactions.     Screening performed by YE Singh MA on 6/13/2025 at 1:22 PM.

## 2025-06-15 ASSESSMENT — ENCOUNTER SYMPTOMS
SORE THROAT: 0
BACK PAIN: 0
SHORTNESS OF BREATH: 0
COUGH: 0
FEVER: 0
DYSURIA: 0
COLOR CHANGE: 0
PALPITATIONS: 0
DYSPHORIC MOOD: 1
CHILLS: 0
VOMITING: 0
ARTHRALGIAS: 0
ABDOMINAL PAIN: 0
SEIZURES: 0
AGITATION: 1
NERVOUS/ANXIOUS: 1
HEMATURIA: 0
SLEEP DISTURBANCE: 1
EYE PAIN: 0
BRUISES/BLEEDS EASILY: 0

## 2025-07-05 DIAGNOSIS — N95.9 MENOPAUSAL AND POSTMENOPAUSAL DISORDER: ICD-10-CM

## 2025-07-07 RX ORDER — ESTRADIOL 2 MG/1
2 TABLET ORAL
Qty: 90 TABLET | Refills: 1 | Status: SHIPPED | OUTPATIENT
Start: 2025-07-07